# Patient Record
Sex: FEMALE | Race: WHITE | NOT HISPANIC OR LATINO
[De-identification: names, ages, dates, MRNs, and addresses within clinical notes are randomized per-mention and may not be internally consistent; named-entity substitution may affect disease eponyms.]

---

## 2017-03-01 ENCOUNTER — RX RENEWAL (OUTPATIENT)
Age: 67
End: 2017-03-01

## 2017-03-29 ENCOUNTER — NON-APPOINTMENT (OUTPATIENT)
Age: 67
End: 2017-03-29

## 2017-03-29 ENCOUNTER — APPOINTMENT (OUTPATIENT)
Dept: CARDIOLOGY | Facility: CLINIC | Age: 67
End: 2017-03-29

## 2017-03-29 VITALS
OXYGEN SATURATION: 95 % | WEIGHT: 232 LBS | SYSTOLIC BLOOD PRESSURE: 123 MMHG | BODY MASS INDEX: 38.65 KG/M2 | HEART RATE: 77 BPM | DIASTOLIC BLOOD PRESSURE: 82 MMHG | HEIGHT: 65 IN

## 2017-03-29 DIAGNOSIS — R73.03 PREDIABETES.: ICD-10-CM

## 2017-04-10 ENCOUNTER — APPOINTMENT (OUTPATIENT)
Dept: CARDIOLOGY | Facility: CLINIC | Age: 67
End: 2017-04-10

## 2017-04-25 ENCOUNTER — RX RENEWAL (OUTPATIENT)
Age: 67
End: 2017-04-25

## 2017-05-02 ENCOUNTER — APPOINTMENT (OUTPATIENT)
Dept: CARDIOLOGY | Facility: CLINIC | Age: 67
End: 2017-05-02

## 2017-10-18 ENCOUNTER — RX RENEWAL (OUTPATIENT)
Age: 67
End: 2017-10-18

## 2018-05-18 ENCOUNTER — APPOINTMENT (OUTPATIENT)
Dept: VASCULAR SURGERY | Facility: CLINIC | Age: 68
End: 2018-05-18
Payer: MEDICARE

## 2018-05-18 ENCOUNTER — MOBILE ON CALL (OUTPATIENT)
Age: 68
End: 2018-05-18

## 2018-05-18 VITALS — TEMPERATURE: 98 F | SYSTOLIC BLOOD PRESSURE: 144 MMHG | HEART RATE: 74 BPM | DIASTOLIC BLOOD PRESSURE: 80 MMHG

## 2018-05-18 PROCEDURE — 93970 EXTREMITY STUDY: CPT

## 2018-05-18 PROCEDURE — 99203 OFFICE O/P NEW LOW 30 MIN: CPT

## 2018-05-29 ENCOUNTER — RX RENEWAL (OUTPATIENT)
Age: 68
End: 2018-05-29

## 2018-06-04 ENCOUNTER — APPOINTMENT (OUTPATIENT)
Dept: VASCULAR SURGERY | Facility: CLINIC | Age: 68
End: 2018-06-04
Payer: MEDICARE

## 2018-06-04 PROCEDURE — 36475 ENDOVENOUS RF 1ST VEIN: CPT | Mod: LT

## 2018-06-07 ENCOUNTER — APPOINTMENT (OUTPATIENT)
Dept: VASCULAR SURGERY | Facility: CLINIC | Age: 68
End: 2018-06-07
Payer: MEDICARE

## 2018-06-07 PROCEDURE — 93971 EXTREMITY STUDY: CPT

## 2018-06-11 ENCOUNTER — RX RENEWAL (OUTPATIENT)
Age: 68
End: 2018-06-11

## 2018-06-11 DIAGNOSIS — I83.90 ASYMPTOMATIC VARICOSE VEINS OF UNSPECIFIED LOWER EXTREMITY: ICD-10-CM

## 2018-06-15 ENCOUNTER — APPOINTMENT (OUTPATIENT)
Dept: VASCULAR SURGERY | Facility: CLINIC | Age: 68
End: 2018-06-15
Payer: MEDICARE

## 2018-06-15 PROCEDURE — 36475 ENDOVENOUS RF 1ST VEIN: CPT | Mod: RT

## 2018-06-19 ENCOUNTER — APPOINTMENT (OUTPATIENT)
Dept: VASCULAR SURGERY | Facility: CLINIC | Age: 68
End: 2018-06-19
Payer: MEDICARE

## 2018-06-19 PROCEDURE — 93971 EXTREMITY STUDY: CPT

## 2018-06-27 ENCOUNTER — RX RENEWAL (OUTPATIENT)
Age: 68
End: 2018-06-27

## 2018-07-02 ENCOUNTER — APPOINTMENT (OUTPATIENT)
Dept: VASCULAR SURGERY | Facility: CLINIC | Age: 68
End: 2018-07-02
Payer: MEDICARE

## 2018-07-02 PROCEDURE — 37766 PHLEB VEINS - EXTREM 20+: CPT | Mod: LT

## 2018-07-05 ENCOUNTER — APPOINTMENT (OUTPATIENT)
Dept: VASCULAR SURGERY | Facility: CLINIC | Age: 68
End: 2018-07-05

## 2018-08-17 ENCOUNTER — APPOINTMENT (OUTPATIENT)
Dept: VASCULAR SURGERY | Facility: CLINIC | Age: 68
End: 2018-08-17

## 2018-10-05 ENCOUNTER — RX RENEWAL (OUTPATIENT)
Age: 68
End: 2018-10-05

## 2018-11-27 ENCOUNTER — NON-APPOINTMENT (OUTPATIENT)
Age: 68
End: 2018-11-27

## 2018-11-27 ENCOUNTER — APPOINTMENT (OUTPATIENT)
Dept: CARDIOLOGY | Facility: CLINIC | Age: 68
End: 2018-11-27
Payer: MEDICARE

## 2018-11-27 VITALS
DIASTOLIC BLOOD PRESSURE: 85 MMHG | OXYGEN SATURATION: 95 % | WEIGHT: 233 LBS | HEART RATE: 84 BPM | SYSTOLIC BLOOD PRESSURE: 140 MMHG | HEIGHT: 65 IN | BODY MASS INDEX: 38.82 KG/M2

## 2018-11-27 VITALS — DIASTOLIC BLOOD PRESSURE: 72 MMHG | SYSTOLIC BLOOD PRESSURE: 130 MMHG

## 2018-11-27 PROCEDURE — 93000 ELECTROCARDIOGRAM COMPLETE: CPT

## 2018-11-27 PROCEDURE — 99215 OFFICE O/P EST HI 40 MIN: CPT

## 2018-11-27 NOTE — PHYSICAL EXAM
[General Appearance - In No Acute Distress] : no acute distress [Normal Conjunctiva] : the conjunctiva exhibited no abnormalities [No Oral Pallor] : no oral pallor [] : no respiratory distress [Respiration, Rhythm And Depth] : normal respiratory rhythm and effort [Bowel Sounds] : normal bowel sounds [Abdomen Tenderness] : non-tender [Abnormal Walk] : normal gait [Cyanosis, Localized] : no localized cyanosis [Skin Color & Pigmentation] : normal skin color and pigmentation [Oriented To Time, Place, And Person] : oriented to person, place, and time [Not Palpable] : not palpable [No Precordial Heave] : no precordial heave was noted [Normal Rate] : normal [Rhythm Regular] : regular [Normal S1] : normal S1 [Normal S2] : normal S2 [No Gallop] : no gallop heard [I] : a grade 1 [2+] : left 2+ [No Abnormalities] : the abdominal aorta was not enlarged and no bruit was heard [No Pitting Edema] : no pitting edema present [FreeTextEntry1] : generalized diminished breath sounds [Apical Thrill] : no thrill palpable at the apex [Click] : no click [Pericardial Rub] : no pericardial rub [Right Carotid Bruit] : no bruit heard over the right carotid [Left Carotid Bruit] : no bruit heard over the left carotid

## 2018-11-27 NOTE — HISTORY OF PRESENT ILLNESS
[FreeTextEntry1] : Ms. Kathy Alexander presented to the office today for follow-up cardiac evaluation.\par \par The patient is a 67-year-old female with a history of atypical chest discomfort, hypertension, a dyslipidemia, lower extremity venous insufficiency (status post RF ablation bilaterally), spinal arthritis.\par \par The patient has been doing well from a cardiac symptomatic standpoint since her previous visit here on 3/29/17. Specifically, she does not describe having experienced chest discomfort or dyspnea on exertion in association with her activities, noting that she walks regularly and participates in yoga activities. She has not noted orthopnea or paroxysmal nocturnal dyspnea. She had noted improvement in her degree of lower extremity edema since undergoing the venous ablation procedures bilaterally. She has not experienced any episodes of palpitations, presyncope or syncope.\par \par Review of systems is significant for the patient having been evaluated by a vascular specialist regarding lower extremity venous insufficiency on 5/18/18. She subsequently underwent ablation procedures to both lower extremities, as well as a stab phlebectomy to the left lower extremity, over the following 1 to 2 months.\par \par As far as risk factors for coronary artery disease are concerned, the patient has a history of hypertension, a dyslipidemia (hypertriglyceridemia), and pre-diabetes. She discontinued cigarette smoking in January of 1991, having previously smoked an average of one pack per day for a period of 20 years. She describes having an immediate family history of premature coronary artery disease, stating that her brother suffered "a heart attack" at the age of 49.\par \par Past medical/surgical history according to the patient is otherwise significant for a cholecystectomy, a hysterectomy, and parathyroid surgery.\par \par Echocardiography most recently performed on 4/10/17 revealed normal cardiac chamber sizes with normal left ventricular wall thickness and wall motion. Left ventricular systolic function was normal, with an estimated ejection fraction of 65%. There was evidence for mildly impaired diastolic relaxation of the left ventricle. No significant valvular abnormalities were identified.\par \par Exercise stress testing most recently performed on 5/2/17 revealed the patient to exhibit a satisfactory exercise tolerance, or that the inducement of cardiac symptoms, electrocardiographic evidence of myocardial ischemia, or significant arrhythmias.\par \par Nuclear exercise testing previously performed at a radiology facility on 2/13/09 was interpreted as revealing the patient to exhibit satisfactory exercise tolerance, without the inducement of cardiac symptoms, electrocardiographic evidence of myocardial ischemia, or significant arrhythmias. The cardiac imaging portion of the study revealed normal left ventricular myocardial perfusion and systolic function, with a calculated ejection fraction of 90%\par \par Carotid artery Doppler testing performed most recently on 1/6/15 at a radiology facility was not reported as having demonstrated evidence for atherosclerosis formation or significant stenoses.\par \par Laboratory studies most recently performed on 3/1/17 revealed cholesterol 148, triglycerides 146, HDL 37, and calculated LDL 82. The liver chemistries were normal. The glucose level was 105 and the hemoglobin A1c level was 6.3%.  The BUN and creatinine were 16 and 0.76, respectively. The potassium level was 4.3 and her sodium level was 141. The thyroid stimulating hormone level was 1.12. The hemoglobin and hematocrit were 13.9 and 41.1, respectively.

## 2018-11-27 NOTE — DISCUSSION/SUMMARY
[FreeTextEntry1] : The patient has been doing well from a cardiac symptomatic standpoint since her previous visit here on 3/29/17. Specifically, she does not describe having experienced any symptoms of chest discomfort which would be considered typical for an anginal syndrome. She has not experienced any signs or symptoms to suggest the presence of congestive heart failure or a hemodynamically-compromising arrhythmia. Her cardiac examination today is remarkable for a faint systolic ejection murmur, unchanged from her previous visit with me. Her blood pressure reading today is normal. Her electrocardiogram today reveals sinus rhythm with a mildly leftward axis, a right intraventricular conduction delay, non-specific poor R wave progression, and non-diagnostic repolarization abnormalities. Comparison with her previous office tracing performed on 3/29/17 reveals a mild increase in the QRS duration on the present tracing.\par \par I have reviewed the findings of the blood test of 3/1/17 in detail with the patient today, and I have recommended to her that she continue on the present dosage of Trilipix for the time being. I asked to have a copy of her most recent fasting lipid profile and chemistry screen results forwarded to my office for my review and records.\par \par As her blood pressure reading today s normal, I have instructed her to continue on the present dosages of Toprol-XL and Dyazide for the time being.\par \par The importance of dietary modification, weight loss, and regular exercise as tolerated was again emphasized to the patient today.\par \par I have reviewed the findings of the exercise stress test in 5/2/17 and the echocardiogram of 4/10/17 in detail with the patient today.\par \par I have asked the patient to call me if she should have any questions or problems pertaining to these matters, and especially if she should experience any concerning symptoms. I have otherwise asked her to return to the office for follow-up cardiac evaluation in one year, provided she remains clinically stable in the interim.

## 2019-01-28 ENCOUNTER — MEDICATION RENEWAL (OUTPATIENT)
Age: 69
End: 2019-01-28

## 2019-05-21 ENCOUNTER — APPOINTMENT (OUTPATIENT)
Dept: CARDIOLOGY | Facility: CLINIC | Age: 69
End: 2019-05-21
Payer: MEDICARE

## 2019-05-21 ENCOUNTER — NON-APPOINTMENT (OUTPATIENT)
Age: 69
End: 2019-05-21

## 2019-05-21 VITALS
OXYGEN SATURATION: 91 % | SYSTOLIC BLOOD PRESSURE: 130 MMHG | BODY MASS INDEX: 38.15 KG/M2 | WEIGHT: 229 LBS | DIASTOLIC BLOOD PRESSURE: 84 MMHG | HEIGHT: 65 IN | HEART RATE: 87 BPM

## 2019-05-21 PROCEDURE — 99214 OFFICE O/P EST MOD 30 MIN: CPT

## 2019-05-21 PROCEDURE — 93000 ELECTROCARDIOGRAM COMPLETE: CPT

## 2019-05-21 NOTE — DISCUSSION/SUMMARY
[FreeTextEntry1] : She has been doing well from a cardiac symptomatic standpoint since her previous visit, though she now reports episodes of dizziness.\par Her EKG demonstrates a SR with RBBB and LAD. Though I think her dizziness is not primarily cardiac related, she does have baseline conduction disease which seems to have progressed slightly. I have offered her a 24 hour holter monitor, though her symptoms may not be frequent enough. I have asked her to monitor her heart rate on her fit bit to see if there is any significant decreased (or increase) in her heart rate with the symptoms. We may then need to set her up for a longer term monitor.\par She will have a 2d echocardiogram in our office to evaluate her heart murmur. She is to continue her current BP regimen.\par She is to stay hydrated, exercise, and monitor her BP at home. The importance of dietary modification, weight loss, and regular exercise as tolerated was again emphasized to the patient today.I will call her with the results of the above test and arrange follow up.\par

## 2019-05-21 NOTE — HISTORY OF PRESENT ILLNESS
[FreeTextEntry1] : Ms. Kathy Alexander presented to the office today for follow-up cardiac evaluation.\par \par The patient is a 68-year-old female with a history of atypical chest discomfort, hypertension, a dyslipidemia, lower extremity venous insufficiency (status post RF ablation bilaterally), spinal arthritis.\par \par The patient has been doing well from a cardiac symptomatic standpoint until recently. \par She has reports episodes of dizziness, that occur a few times per week. These episodes start with a feeling in her feet and legs, that moves up to her head. She then is dizzy for 30 seconds. She denies chest pain, difficulty breathing, palpitations, lightheadedness or getting to the point of syncope.\par \par There is no association with her activities, noting that she walks regularly and participates in yoga activities. Though she wears a fitbit, she has not checked her heart rate during the episodes.\par \par Review of systems is significant for the patient having been evaluated by a vascular specialist regarding lower extremity venous insufficiency on 5/18/18. She subsequently underwent ablation procedures to both lower extremities, as well as a stab phlebectomy to the left lower extremity, over the following 1 to 2 months.\par \par As far as risk factors for coronary artery disease are concerned, the patient has a history of hypertension, a dyslipidemia (hypertriglyceridemia), and pre-diabetes. She discontinued cigarette smoking in January of 1991, having previously smoked an average of one pack per day for a period of 20 years.\par Past medical/surgical history according to the patient is otherwise significant for a cholecystectomy, a hysterectomy, and parathyroid surgery.

## 2019-05-21 NOTE — PHYSICAL EXAM
[General Appearance - In No Acute Distress] : no acute distress [Normal Conjunctiva] : the conjunctiva exhibited no abnormalities [No Oral Pallor] : no oral pallor [] : no respiratory distress [Respiration, Rhythm And Depth] : normal respiratory rhythm and effort [Bowel Sounds] : normal bowel sounds [Abdomen Tenderness] : non-tender [Abnormal Walk] : normal gait [Cyanosis, Localized] : no localized cyanosis [Skin Color & Pigmentation] : normal skin color and pigmentation [Oriented To Time, Place, And Person] : oriented to person, place, and time [Not Palpable] : not palpable [No Precordial Heave] : no precordial heave was noted [Normal Rate] : normal [Rhythm Regular] : regular [Normal S1] : normal S1 [Normal S2] : normal S2 [No Gallop] : no gallop heard [I] : a grade 1 [2+] : left 2+ [No Abnormalities] : the abdominal aorta was not enlarged and no bruit was heard [No Pitting Edema] : no pitting edema present [General Appearance - Well Developed] : well developed [Normal Appearance] : normal appearance [Well Groomed] : well groomed [General Appearance - Well Nourished] : well nourished [No Deformities] : no deformities [FreeTextEntry1] : generalized diminished breath sounds [Apical Thrill] : no thrill palpable at the apex [Click] : no click [Pericardial Rub] : no pericardial rub [II] : a grade 2 [Right Carotid Bruit] : no bruit heard over the right carotid [Left Carotid Bruit] : no bruit heard over the left carotid

## 2019-05-30 ENCOUNTER — APPOINTMENT (OUTPATIENT)
Dept: CARDIOLOGY | Facility: CLINIC | Age: 69
End: 2019-05-30
Payer: MEDICARE

## 2019-05-30 PROCEDURE — 93306 TTE W/DOPPLER COMPLETE: CPT

## 2019-07-18 ENCOUNTER — OUTPATIENT (OUTPATIENT)
Dept: OUTPATIENT SERVICES | Facility: HOSPITAL | Age: 69
LOS: 1 days | End: 2019-07-18
Payer: MEDICARE

## 2019-07-18 VITALS
HEART RATE: 77 BPM | OXYGEN SATURATION: 97 % | SYSTOLIC BLOOD PRESSURE: 134 MMHG | DIASTOLIC BLOOD PRESSURE: 82 MMHG | WEIGHT: 220.02 LBS | TEMPERATURE: 98 F | HEIGHT: 65 IN | RESPIRATION RATE: 16 BRPM

## 2019-07-18 DIAGNOSIS — D21.0 BENIGN NEOPLASM OF CONNECTIVE AND OTHER SOFT TISSUE OF HEAD, FACE AND NECK: ICD-10-CM

## 2019-07-18 DIAGNOSIS — Z01.818 ENCOUNTER FOR OTHER PREPROCEDURAL EXAMINATION: ICD-10-CM

## 2019-07-18 LAB
ALBUMIN SERPL ELPH-MCNC: 4.1 G/DL — SIGNIFICANT CHANGE UP (ref 3.3–5)
ALP SERPL-CCNC: 50 U/L — SIGNIFICANT CHANGE UP (ref 40–120)
ALT FLD-CCNC: 38 U/L — SIGNIFICANT CHANGE UP (ref 12–78)
ANION GAP SERPL CALC-SCNC: 5 MMOL/L — SIGNIFICANT CHANGE UP (ref 5–17)
AST SERPL-CCNC: 20 U/L — SIGNIFICANT CHANGE UP (ref 15–37)
BILIRUB SERPL-MCNC: 0.5 MG/DL — SIGNIFICANT CHANGE UP (ref 0.2–1.2)
BUN SERPL-MCNC: 18 MG/DL — SIGNIFICANT CHANGE UP (ref 7–23)
CALCIUM SERPL-MCNC: 9.7 MG/DL — SIGNIFICANT CHANGE UP (ref 8.5–10.1)
CHLORIDE SERPL-SCNC: 105 MMOL/L — SIGNIFICANT CHANGE UP (ref 96–108)
CO2 SERPL-SCNC: 30 MMOL/L — SIGNIFICANT CHANGE UP (ref 22–31)
CREAT SERPL-MCNC: 0.74 MG/DL — SIGNIFICANT CHANGE UP (ref 0.5–1.3)
GLUCOSE SERPL-MCNC: 108 MG/DL — HIGH (ref 70–99)
HCT VFR BLD CALC: 42.6 % — SIGNIFICANT CHANGE UP (ref 34.5–45)
HGB BLD-MCNC: 14.1 G/DL — SIGNIFICANT CHANGE UP (ref 11.5–15.5)
MCHC RBC-ENTMCNC: 28 PG — SIGNIFICANT CHANGE UP (ref 27–34)
MCHC RBC-ENTMCNC: 33.1 GM/DL — SIGNIFICANT CHANGE UP (ref 32–36)
MCV RBC AUTO: 84.7 FL — SIGNIFICANT CHANGE UP (ref 80–100)
NRBC # BLD: 0 /100 WBCS — SIGNIFICANT CHANGE UP (ref 0–0)
PLATELET # BLD AUTO: 307 K/UL — SIGNIFICANT CHANGE UP (ref 150–400)
POTASSIUM SERPL-MCNC: 3.8 MMOL/L — SIGNIFICANT CHANGE UP (ref 3.5–5.3)
POTASSIUM SERPL-SCNC: 3.8 MMOL/L — SIGNIFICANT CHANGE UP (ref 3.5–5.3)
PROT SERPL-MCNC: 7.6 G/DL — SIGNIFICANT CHANGE UP (ref 6–8.3)
RBC # BLD: 5.03 M/UL — SIGNIFICANT CHANGE UP (ref 3.8–5.2)
RBC # FLD: 12.8 % — SIGNIFICANT CHANGE UP (ref 10.3–14.5)
SODIUM SERPL-SCNC: 140 MMOL/L — SIGNIFICANT CHANGE UP (ref 135–145)
WBC # BLD: 7.18 K/UL — SIGNIFICANT CHANGE UP (ref 3.8–10.5)
WBC # FLD AUTO: 7.18 K/UL — SIGNIFICANT CHANGE UP (ref 3.8–10.5)

## 2019-07-18 PROCEDURE — 71046 X-RAY EXAM CHEST 2 VIEWS: CPT

## 2019-07-18 PROCEDURE — 85027 COMPLETE CBC AUTOMATED: CPT

## 2019-07-18 PROCEDURE — 80053 COMPREHEN METABOLIC PANEL: CPT

## 2019-07-18 PROCEDURE — 71046 X-RAY EXAM CHEST 2 VIEWS: CPT | Mod: 26

## 2019-07-18 PROCEDURE — 93005 ELECTROCARDIOGRAM TRACING: CPT

## 2019-07-18 PROCEDURE — 36415 COLL VENOUS BLD VENIPUNCTURE: CPT

## 2019-07-18 PROCEDURE — 93010 ELECTROCARDIOGRAM REPORT: CPT

## 2019-07-18 PROCEDURE — G0463: CPT

## 2019-07-18 NOTE — H&P PST ADULT - LIVES WITH, PROFILE
Reason For Visit  GIOVANI FOSTER is here today for a nurse visit for medication administration B12 pt tolerated well with no adverse reactions.      Current Meds   1. Lisinopril 2.5 MG Oral Tablet; take one tablet by mouth one time daily;   Therapy: [id number] to (Last Rx:[id number])  Requested for: [id number] Ordered    Allergies  No Known Drug Allergies    Plan   1. med B12 1000MCG    Signatures   Electronically signed by : ERICA James; Apr 26 2018 11:06AM CST    Electronically signed by : NATALIE WANG M.D.; Apr 26 2018 11:10AM CST (Review)    
spouse

## 2019-07-18 NOTE — H&P PST ADULT - NSICDXPASTSURGICALHX_GEN_ALL_CORE_FT
PAST SURGICAL HISTORY:  S/P  Section     S/P cholecystectomy     S/P hysterectomy     S/P parathyroidectomy     S/P tonsillectomy 1967    S/P tubal ligation 1992

## 2019-07-18 NOTE — H&P PST ADULT - HISTORY OF PRESENT ILLNESS
67 yo F for excision right scalp mass 69 yo F for excision right scalp mass  Pt has had mass x 6 mos increasing in size

## 2019-07-18 NOTE — H&P PST ADULT - NSANTHOSAYNRD_GEN_A_CORE
No. REENA screening performed.  STOP BANG Legend: 0-2 = LOW Risk; 3-4 = INTERMEDIATE Risk; 5-8 = HIGH Risk

## 2019-07-30 ENCOUNTER — TRANSCRIPTION ENCOUNTER (OUTPATIENT)
Age: 69
End: 2019-07-30

## 2019-07-31 ENCOUNTER — OUTPATIENT (OUTPATIENT)
Dept: OUTPATIENT SERVICES | Facility: HOSPITAL | Age: 69
LOS: 1 days | End: 2019-07-31
Payer: MEDICARE

## 2019-07-31 ENCOUNTER — RESULT REVIEW (OUTPATIENT)
Age: 69
End: 2019-07-31

## 2019-07-31 VITALS
WEIGHT: 216.05 LBS | TEMPERATURE: 98 F | DIASTOLIC BLOOD PRESSURE: 72 MMHG | SYSTOLIC BLOOD PRESSURE: 145 MMHG | HEART RATE: 68 BPM | RESPIRATION RATE: 16 BRPM | HEIGHT: 65 IN | OXYGEN SATURATION: 97 %

## 2019-07-31 VITALS
HEART RATE: 70 BPM | SYSTOLIC BLOOD PRESSURE: 122 MMHG | OXYGEN SATURATION: 97 % | DIASTOLIC BLOOD PRESSURE: 76 MMHG | RESPIRATION RATE: 15 BRPM | TEMPERATURE: 99 F

## 2019-07-31 DIAGNOSIS — Z98.890 OTHER SPECIFIED POSTPROCEDURAL STATES: Chronic | ICD-10-CM

## 2019-07-31 DIAGNOSIS — D21.0 BENIGN NEOPLASM OF CONNECTIVE AND OTHER SOFT TISSUE OF HEAD, FACE AND NECK: ICD-10-CM

## 2019-07-31 DIAGNOSIS — Z01.818 ENCOUNTER FOR OTHER PREPROCEDURAL EXAMINATION: ICD-10-CM

## 2019-07-31 PROCEDURE — 12032 INTMD RPR S/A/T/EXT 2.6-7.5: CPT

## 2019-07-31 PROCEDURE — 11426 EXC H-F-NK-SP B9+MARG >4 CM: CPT

## 2019-07-31 PROCEDURE — 88305 TISSUE EXAM BY PATHOLOGIST: CPT | Mod: 26

## 2019-07-31 PROCEDURE — 88305 TISSUE EXAM BY PATHOLOGIST: CPT

## 2019-07-31 RX ORDER — OXYCODONE HYDROCHLORIDE 5 MG/1
5 TABLET ORAL ONCE
Refills: 0 | Status: DISCONTINUED | OUTPATIENT
Start: 2019-07-31 | End: 2019-07-31

## 2019-07-31 RX ORDER — METOCLOPRAMIDE HCL 10 MG
5 TABLET ORAL ONCE
Refills: 0 | Status: DISCONTINUED | OUTPATIENT
Start: 2019-07-31 | End: 2019-07-31

## 2019-07-31 RX ORDER — HYDROMORPHONE HYDROCHLORIDE 2 MG/ML
0.5 INJECTION INTRAMUSCULAR; INTRAVENOUS; SUBCUTANEOUS
Refills: 0 | Status: DISCONTINUED | OUTPATIENT
Start: 2019-07-31 | End: 2019-07-31

## 2019-07-31 RX ORDER — SODIUM CHLORIDE 9 MG/ML
1000 INJECTION, SOLUTION INTRAVENOUS
Refills: 0 | Status: DISCONTINUED | OUTPATIENT
Start: 2019-07-31 | End: 2019-07-31

## 2019-07-31 RX ORDER — CEFAZOLIN SODIUM 1 G
1000 VIAL (EA) INJECTION ONCE
Refills: 0 | Status: COMPLETED | OUTPATIENT
Start: 2019-07-31 | End: 2019-07-31

## 2019-07-31 RX ADMIN — SODIUM CHLORIDE 100 MILLILITER(S): 9 INJECTION, SOLUTION INTRAVENOUS at 11:23

## 2019-07-31 RX ADMIN — SODIUM CHLORIDE 75 MILLILITER(S): 9 INJECTION, SOLUTION INTRAVENOUS at 09:40

## 2019-07-31 NOTE — ASU DISCHARGE PLAN (ADULT/PEDIATRIC) - CARE PROVIDER_API CALL
Yassine Garcia ()  Surgery  [address] , [place] [address]  Phone: [phone]  Fax: [phone]  Follow Up Time:

## 2019-07-31 NOTE — ASU DISCHARGE PLAN (ADULT/PEDIATRIC) - CALL YOUR DOCTOR IF YOU HAVE ANY OF THE FOLLOWING:
Bleeding that does not stop/Pain not relieved by Medications/Wound/Surgical Site with redness, or foul smelling discharge or pus/Fever greater than (need to indicate Fahrenheit or Celsius)/Swelling that gets worse

## 2019-07-31 NOTE — ASU PATIENT PROFILE, ADULT - PSH
S/P bunionectomy  right bunionectomy with screws  S/P  Section    S/P cholecystectomy    S/P hysterectomy    S/P parathyroidectomy    S/P tonsillectomy  1967  S/P tubal ligation  1992

## 2019-08-01 LAB — SURGICAL PATHOLOGY STUDY: SIGNIFICANT CHANGE UP

## 2019-09-04 ENCOUNTER — APPOINTMENT (OUTPATIENT)
Dept: VASCULAR SURGERY | Facility: CLINIC | Age: 69
End: 2019-09-04
Payer: MEDICARE

## 2019-09-04 VITALS
HEIGHT: 65 IN | WEIGHT: 213 LBS | TEMPERATURE: 99.1 F | HEART RATE: 75 BPM | DIASTOLIC BLOOD PRESSURE: 78 MMHG | SYSTOLIC BLOOD PRESSURE: 136 MMHG | BODY MASS INDEX: 35.49 KG/M2

## 2019-09-04 PROCEDURE — 99212 OFFICE O/P EST SF 10 MIN: CPT

## 2019-09-04 PROCEDURE — 93970 EXTREMITY STUDY: CPT

## 2019-09-04 NOTE — PHYSICAL EXAM
[Normal Breath Sounds] : Normal breath sounds [Normal Heart Sounds] : normal heart sounds [2+] : right 2+ [Varicose Veins Of Lower Extremities] : bilaterally [Ankle Swelling On The Right] : mild [JVD] : no jugular venous distention  [Ankle Swelling (On Exam)] : not present [Alert] : alert [] : not present [Oriented to Person] : oriented to person [Oriented to Place] : oriented to place [Oriented to Time] : oriented to time [Calm] : calm [de-identified] : well appearing  [de-identified] : Small palpable varicosities on medialand lateral  right leg

## 2019-09-04 NOTE — HISTORY OF PRESENT ILLNESS
[FreeTextEntry1] : 68F s/p left GSV RFA (6/2018) and left stab phlebectomy, right ASV RFA (6/15/2018) presents for evaluation of new varicosities. Over the past few weeks she noticed new varicosities on both her right and left leg, which have been itchy and tender. She denies any fatigue, heaviness or cramps.  She has been wearing her compression stockings.

## 2019-09-04 NOTE — ASSESSMENT
[FreeTextEntry1] : 68F s/p R ASV RFA and L GSV RFA with new varicosties and some right ASV reflux. \par - Will continue compression stockings. \par may need stab avulsions in the future if symptoms get worse

## 2019-09-04 NOTE — DATA REVIEWED
[FreeTextEntry1] : Duplex shows some right ASV reflux, less than 5cm from junction. Multiple varicosities in right and left LE. Right and left GSV closed

## 2019-10-15 ENCOUNTER — APPOINTMENT (OUTPATIENT)
Dept: CARDIOLOGY | Facility: CLINIC | Age: 69
End: 2019-10-15
Payer: MEDICARE

## 2019-10-15 ENCOUNTER — NON-APPOINTMENT (OUTPATIENT)
Age: 69
End: 2019-10-15

## 2019-10-15 VITALS
BODY MASS INDEX: 35.82 KG/M2 | DIASTOLIC BLOOD PRESSURE: 84 MMHG | WEIGHT: 215 LBS | SYSTOLIC BLOOD PRESSURE: 131 MMHG | OXYGEN SATURATION: 97 % | HEIGHT: 65 IN | HEART RATE: 80 BPM

## 2019-10-15 PROCEDURE — 99215 OFFICE O/P EST HI 40 MIN: CPT

## 2019-10-15 PROCEDURE — 93000 ELECTROCARDIOGRAM COMPLETE: CPT

## 2019-10-15 NOTE — HISTORY OF PRESENT ILLNESS
[FreeTextEntry1] : Ms. Kathy Alexander presented to the office today for follow-up cardiac evaluation.\par \par The patient is a 68-year-old female with a history of a right bundle branch block pattern on her electrocardiogram, atypical chest discomfort, hypertension, a dyslipidemia, pre-diabetes, lower extremity venous insufficiency (status post RF ablation bilaterally), spinal arthritis.\par \par The patient has been doing well from a cardiac symptomatic standpoint since her previous visit here. Specifically, she does not describe having experienced chest discomfort or dyspnea on exertion in association with her activities, noting that she walks regularly and participates in yoga activities. She has not noted orthopnea or paroxysmal nocturnal dyspnea. She has noted improvement in her degree of lower extremity edema since undergoing the venous ablation procedures bilaterally. She has not experienced any episodes of palpitations, presyncope or syncope.\par \par The patient had previously been evaluated in our office on 5/21/19 by Dr. Clark Re: episodes of dizziness, which the patient states her ultimately attributed to "allergies". She has not experienced recurrence of any episodes of dizziness since August of 2019.\par \par Review of systems is significant for the patient having been evaluated by a vascular specialist regarding lower extremity venous insufficiency on 5/18/18. She subsequently underwent ablation procedures to both lower extremities, as well as a stab phlebectomy to the left lower extremity, over the following 1 to 2 months.\par \par As far as risk factors for coronary artery disease are concerned, the patient has a history of hypertension, a dyslipidemia (hypertriglyceridemia), and pre-diabetes. She discontinued cigarette smoking in January of 1991, having previously smoked an average of one pack per day for a period of 20 years. She describes having an immediate family history of premature coronary artery disease, stating that her brother suffered "a heart attack" at the age of 49.\par \par Past medical/surgical history according to the patient is otherwise significant for a cholecystectomy, a hysterectomy, and parathyroid surgery.\par \par Echocardiography most recently performed on 5/30/19 revealed normal cardiac chamber sizes with normal left ventricular wall thickness and wall motion. Left ventricular systolic function was normal, with an estimated ejection fraction of 70%. There was evidence for mildly impaired diastolic relaxation of the left ventricle. No significant valvular abnormalities were identified.\par \par Exercise stress testing most recently performed on 5/2/17 revealed the patient to exhibit a satisfactory exercise tolerance, or that the inducement of cardiac symptoms, electrocardiographic evidence of myocardial ischemia, or significant arrhythmias.\par \par Nuclear exercise testing previously performed at a radiology facility on 2/13/09 was interpreted as revealing the patient to exhibit satisfactory exercise tolerance, without the inducement of cardiac symptoms, electrocardiographic evidence of myocardial ischemia, or significant arrhythmias. The cardiac imaging portion of the study revealed normal left ventricular myocardial perfusion and systolic function, with a calculated ejection fraction of 90%\par \par Carotid artery Doppler testing performed most recently on 1/6/15 at a radiology facility was not reported as having demonstrated evidence for atherosclerosis formation or significant stenoses.

## 2019-10-15 NOTE — DISCUSSION/SUMMARY
[FreeTextEntry1] : The patient has been doing well from a cardiac symptomatic standpoint since her previous visit here. Specifically, she does not describe having experienced any symptoms of chest discomfort which would be considered typical for an anginal syndrome. She has not experienced any signs or symptoms to suggest the presence of congestive heart failure or a hemodynamically-compromising arrhythmia. Her cardiac examination today is remarkable for a faint systolic ejection murmur, unchanged from her previous visit with me. Her blood pressure reading today is normal. Her electrocardiogram today reveals sinus rhythm with a left anterior hemiblock, a right bundle branch block pattern,, and non-diagnostic repolarization abnormalities, essentially unchanged from her previous office tracing.\par \par I have asked the patient to have a copy of her next fasting lipid profile and chemistry screen results forwarded to my office for my review and records. I have recommended to her that she continue on the present dosage of Trilipix for the time being.\par \par As her blood pressure reading today is normal, I have instructed the patient to continue the present dosages of Toprol-XL and Dyazide for the time being.\par \par The importance of dietary modification, weight loss, and regular exercise as tolerated was again emphasized to the patient today.\par \par I have reviewed the findings of the echocardiogram of 5/30/19 in detail with the patient today.\par \par In view of her multiple cardiovascular risk factors, I am referring the patient for follow-up exercise stress testing at this point to reassess for the presence of underlying ischemic heart disease. The patient is going to make arrangements to have this study performed through our office, and I will telephone her to discuss the findings, once the study has been completed.\par \par I have asked the patient to call me if she should have any questions or problems pertaining to these matters, and especially if she should experience any concerning symptoms. I have otherwise asked her to return to the office for follow-up cardiac evaluation in 6 months, provided she remains clinically stable in the interim.

## 2019-10-15 NOTE — PHYSICAL EXAM
[General Appearance - In No Acute Distress] : no acute distress [Normal Conjunctiva] : the conjunctiva exhibited no abnormalities [No Oral Pallor] : no oral pallor [] : no respiratory distress [Respiration, Rhythm And Depth] : normal respiratory rhythm and effort [Bowel Sounds] : normal bowel sounds [Abdomen Tenderness] : non-tender [Abnormal Walk] : normal gait [Cyanosis, Localized] : no localized cyanosis [Skin Color & Pigmentation] : normal skin color and pigmentation [Oriented To Time, Place, And Person] : oriented to person, place, and time [Not Palpable] : not palpable [No Precordial Heave] : no precordial heave was noted [Normal Rate] : normal [Normal S1] : normal S1 [Rhythm Regular] : regular [Normal S2] : normal S2 [No Gallop] : no gallop heard [I] : a grade 1 [2+] : left 2+ [No Pitting Edema] : no pitting edema present [No Abnormalities] : the abdominal aorta was not enlarged and no bruit was heard [FreeTextEntry1] : generalized diminished breath sounds [Apical Thrill] : no thrill palpable at the apex [Click] : no click [Left Carotid Bruit] : no bruit heard over the left carotid [Pericardial Rub] : no pericardial rub [Right Carotid Bruit] : no bruit heard over the right carotid

## 2019-10-16 ENCOUNTER — MEDICATION RENEWAL (OUTPATIENT)
Age: 69
End: 2019-10-16

## 2019-11-19 ENCOUNTER — APPOINTMENT (OUTPATIENT)
Dept: CARDIOLOGY | Facility: CLINIC | Age: 69
End: 2019-11-19
Payer: MEDICARE

## 2019-11-19 PROCEDURE — 93015 CV STRESS TEST SUPVJ I&R: CPT

## 2020-12-29 ENCOUNTER — APPOINTMENT (OUTPATIENT)
Dept: CARDIOLOGY | Facility: CLINIC | Age: 70
End: 2020-12-29
Payer: MEDICARE

## 2020-12-29 ENCOUNTER — NON-APPOINTMENT (OUTPATIENT)
Age: 70
End: 2020-12-29

## 2020-12-29 VITALS
OXYGEN SATURATION: 96 % | DIASTOLIC BLOOD PRESSURE: 90 MMHG | HEART RATE: 81 BPM | SYSTOLIC BLOOD PRESSURE: 150 MMHG | HEIGHT: 65 IN | BODY MASS INDEX: 36.65 KG/M2 | WEIGHT: 220 LBS

## 2020-12-29 PROCEDURE — 93000 ELECTROCARDIOGRAM COMPLETE: CPT

## 2020-12-29 PROCEDURE — 99215 OFFICE O/P EST HI 40 MIN: CPT

## 2020-12-29 RX ORDER — SERTRALINE HYDROCHLORIDE 50 MG/1
50 TABLET, FILM COATED ORAL
Qty: 90 | Refills: 0 | Status: DISCONTINUED | COMMUNITY
Start: 2020-10-09 | End: 2020-12-29

## 2020-12-29 RX ORDER — MELOXICAM 15 MG/1
15 TABLET ORAL
Qty: 30 | Refills: 0 | Status: DISCONTINUED | COMMUNITY
Start: 2020-12-21 | End: 2020-12-29

## 2021-01-29 ENCOUNTER — NON-APPOINTMENT (OUTPATIENT)
Age: 71
End: 2021-01-29

## 2021-02-01 ENCOUNTER — APPOINTMENT (OUTPATIENT)
Dept: CARDIOLOGY | Facility: CLINIC | Age: 71
End: 2021-02-01

## 2022-06-28 ENCOUNTER — NON-APPOINTMENT (OUTPATIENT)
Age: 72
End: 2022-06-28

## 2022-07-05 ENCOUNTER — RESULT CHARGE (OUTPATIENT)
Age: 72
End: 2022-07-05

## 2022-07-06 ENCOUNTER — APPOINTMENT (OUTPATIENT)
Dept: CARDIOLOGY | Facility: CLINIC | Age: 72
End: 2022-07-06

## 2022-07-06 ENCOUNTER — NON-APPOINTMENT (OUTPATIENT)
Age: 72
End: 2022-07-06

## 2022-07-06 VITALS — DIASTOLIC BLOOD PRESSURE: 70 MMHG | SYSTOLIC BLOOD PRESSURE: 115 MMHG

## 2022-07-06 VITALS
WEIGHT: 216 LBS | DIASTOLIC BLOOD PRESSURE: 78 MMHG | SYSTOLIC BLOOD PRESSURE: 125 MMHG | BODY MASS INDEX: 35.99 KG/M2 | HEART RATE: 78 BPM | OXYGEN SATURATION: 97 % | HEIGHT: 65 IN

## 2022-07-06 DIAGNOSIS — R94.31 ABNORMAL ELECTROCARDIOGRAM [ECG] [EKG]: ICD-10-CM

## 2022-07-06 DIAGNOSIS — R07.89 OTHER CHEST PAIN: ICD-10-CM

## 2022-07-06 PROCEDURE — 93000 ELECTROCARDIOGRAM COMPLETE: CPT

## 2022-07-06 PROCEDURE — 99214 OFFICE O/P EST MOD 30 MIN: CPT

## 2022-07-06 RX ORDER — FENOFIBRATE 134 MG/1
134 CAPSULE ORAL
Qty: 90 | Refills: 0 | Status: ACTIVE | COMMUNITY
Start: 2022-03-18

## 2022-07-06 RX ORDER — LOSARTAN POTASSIUM 25 MG/1
25 TABLET, FILM COATED ORAL
Qty: 90 | Refills: 0 | Status: ACTIVE | COMMUNITY
Start: 2022-04-12

## 2022-07-06 RX ORDER — TRIAMTERENE AND HYDROCHLOROTHIAZIDE 37.5; 25 MG/1; MG/1
37.5-25 CAPSULE ORAL
Qty: 60 | Refills: 0 | Status: COMPLETED | COMMUNITY
Start: 2022-07-01

## 2022-07-06 RX ORDER — AZITHROMYCIN 500 MG/1
500 TABLET, FILM COATED ORAL
Qty: 10 | Refills: 0 | Status: COMPLETED | COMMUNITY
Start: 2022-06-15

## 2022-07-06 RX ORDER — OXYCODONE 5 MG/1
5 TABLET ORAL
Qty: 42 | Refills: 0 | Status: COMPLETED | COMMUNITY
Start: 2022-04-22

## 2022-07-06 RX ORDER — NITROFURANTOIN (MONOHYDRATE/MACROCRYSTALS) 25; 75 MG/1; MG/1
100 CAPSULE ORAL
Qty: 14 | Refills: 0 | Status: COMPLETED | COMMUNITY
Start: 2022-04-07

## 2022-07-06 NOTE — PHYSICAL EXAM
[General Appearance - In No Acute Distress] : no acute distress [No Oral Pallor] : no oral pallor [] : no respiratory distress [Respiration, Rhythm And Depth] : normal respiratory rhythm and effort [Bowel Sounds] : normal bowel sounds [Abdomen Tenderness] : non-tender [Abnormal Walk] : normal gait [Cyanosis, Localized] : no localized cyanosis [Skin Color & Pigmentation] : normal skin color and pigmentation [Oriented To Time, Place, And Person] : oriented to person, place, and time [Not Palpable] : not palpable [No Precordial Heave] : no precordial heave was noted [Normal Rate] : normal [I] : a grade 1 [2+] : left 2+ [Well Developed] : well developed [Well Nourished] : well nourished [No Acute Distress] : no acute distress [Normal Conjunctiva] : normal conjunctiva [Normal Venous Pressure] : normal venous pressure [No Carotid Bruit] : no carotid bruit [Normal S1, S2] : normal S1, S2 [No Rub] : no rub [No Gallop] : no gallop [Rhythm Regular] : regular [Normal S1] : normal S1 [Normal S2] : normal S2 [No Murmur] : no murmurs heard [No Pitting Edema] : no pitting edema present [No Abnormalities] : the abdominal aorta was not enlarged and no bruit was heard [Clear Lung Fields] : clear lung fields [Good Air Entry] : good air entry [No Respiratory Distress] : no respiratory distress  [Soft] : abdomen soft [Non Tender] : non-tender [No Masses/organomegaly] : no masses/organomegaly [Normal Bowel Sounds] : normal bowel sounds [Normal Gait] : normal gait [No Edema] : no edema [No Cyanosis] : no cyanosis [No Clubbing] : no clubbing [No Varicosities] : no varicosities [No Skin Lesions] : no skin lesions [No Rash] : no rash [Moves all extremities] : moves all extremities [No Focal Deficits] : no focal deficits [Normal Speech] : normal speech [Alert and Oriented] : alert and oriented [Normal memory] : normal memory [Bruit] : no bruit heard [FreeTextEntry1] : generalized diminished breath sounds [Apical Thrill] : no thrill palpable at the apex [Click] : no click [Pericardial Rub] : no pericardial rub [Left Carotid Bruit] : no bruit heard over the left carotid [Right Carotid Bruit] : no bruit heard over the right carotid

## 2022-07-06 NOTE — DISCUSSION/SUMMARY
[FreeTextEntry1] : Ms Alexander presents in no acute distress. \par She does not describe having experienced any symptoms of chest discomfort which would be considered typical anginal symptoms.  She has not experienced any signs or symptoms to suggest the presence of congestive heart failure or a hemodynamically-compromising arrhythmia.  Her blood pressure reading today is normal. Her electrocardiogram today reveals sinus rhythm with left anterior hemiblock, a right bundle branch block pattern, and non-diagnostic repolarization abnormalities, essentially unchanged from her previous office tracing.\par As her blood pressure reading today is normal, I have instructed the patient to continue  Toprol-XL 50mg,  and Dyazide 75/50mg, and Losartan 25mg as added for the time being.\par \par In light of her recent cause of hospitalization, she will have a 24 hour holter to monitor HR variability and for any ivis arrhythmia and bifascicular block.  She will get a 2d echo to assess for any new structural heart disease, changes in valvular and ventricular function.\par \par Most recent stress test 2019, revealed 8 METS. I will defer nuclear stress testing a few more months until she is able to freely walk the treadmill.  Her symptoms of "chest discomfort" do not seem consistent with underlying coronary/ischemic process that would justify the need for a pharm Nuclear stress test performed now.\par \par she will continue LIPID therapy as prescribed. Goal LDL <100, ideally <70\par \par She knows to call me with any acute issues or if chest paresthesia return  \par \par The importance of dietary modification, weight loss, and regular exercise as tolerated was again emphasized to the patient today.\par \par I will call her with results of HOLTER and ECHO. Pending that result she can followup again in 2 months.

## 2022-07-06 NOTE — HISTORY OF PRESENT ILLNESS
[FreeTextEntry1] : Ms. Kathy Alexander presented to the office today for follow-up cardiac evaluation. I last evaluated the patient in the office on 10/15/19.\par \par The patient is a 70-year-old female with a history of a right bundle branch block pattern on her electrocardiogram, atypical chest discomfort, hypertension, a dyslipidemia, pre-diabetes, lower extremity venous insufficiency (status post RF ablation bilaterally), spinal arthritis.\par \par The patient has been doing well from a cardiac symptomatic standpoint since her previous visit here on 10/15/19. Specifically, she does not describe having experienced chest discomfort or dyspnea on exertion in association with her activities, noting that she walks regularly. She has not noted orthopnea or paroxysmal nocturnal dyspnea. She has noted improvement in her degree of lower extremity edema since undergoing the venous ablation procedures bilaterally. She has not experienced any episodes of palpitations, presyncope or syncope.\par \par Review of systems is significant for the patient having been evaluated by a vascular specialist regarding lower extremity venous insufficiency on 5/18/18. She subsequently underwent ablation procedures to both lower extremities, as well as a stab phlebectomy to the left lower extremity, over the following 1 to 2 months.\par \par As far as risk factors for coronary artery disease are concerned, the patient has a history of hypertension, a dyslipidemia (hypertriglyceridemia), and pre-diabetes. She discontinued cigarette smoking in January of 1991, having previously smoked an average of one pack per day for a period of 20 years. She describes having an immediate family history of premature coronary artery disease, stating that her brother suffered "a heart attack" at the age of 49.\par \par Past medical/surgical history according to the patient is otherwise significant for a cholecystectomy, a hysterectomy, and parathyroid surgery.\par \par Echocardiography most recently performed on 5/30/19 revealed normal cardiac chamber sizes with normal left ventricular wall thickness and wall motion. Left ventricular systolic function was normal, with an estimated ejection fraction of 70%. There was evidence for mildly impaired diastolic relaxation of the left ventricle. No significant valvular abnormalities were identified.\par \par Exercise stress testing most recently performed on 11/19/19 revealed the patient to exhibit a satisfactory exercise tolerance, or that the inducement of cardiac symptoms, electrocardiographic evidence of myocardial ischemia, or significant arrhythmias.\par \par Nuclear exercise testing previously performed at a radiology facility on 2/13/09 was interpreted as revealing the patient to exhibit satisfactory exercise tolerance, without the inducement of cardiac symptoms, electrocardiographic evidence of myocardial ischemia, or significant arrhythmias. The cardiac imaging portion of the study revealed normal left ventricular myocardial perfusion and systolic function, with a calculated ejection fraction of 90%\par \par Carotid artery Doppler testing performed most recently on 1/6/15 at a radiology facility was not reported as having demonstrated evidence for atherosclerosis formation or significant stenoses.\par \par Previous visit history as above:\par \par Ms Alexander arrives today for an acute visit. She presents after hospitalization at [facility].  She and her  reports that she rolled out of bed during the early morning last week. Her 's report of the event was that  "it seemed like she was having a bad dream". She sat up partially but was not fully awake, she then rolled side way off the bed and hit forhead on night table and on to the floor.  s\par She was not able get up, He states she was partially in incoherent, just moaning she initially wasn’t responding to her name. Her  called the ambulance and a few minutes later, she was awake.  she was taken to [facility]. they state all work up was negative including CT scan of head.  She was then advised to followup with neurology.\par she also notes an episode of chest pain on the right side of her chest 3 weeks ago while sitting watching TV.  She describes it as "sharp pins and needles".  Lasted for about an hour.  Denies any aggravating or alleviating factors.  She has only experienced this once, has not come back since.  Her  reports, she was moving furniture earlier that day. \par \par she is s/p Rt HIP replacement on 4/25/22, done at [facility]. States the surgery was uneventful.  She is undergoing outpatient physical therapy 2-3 times a week.  she took a break because it was making her sore. \par activity included walking on the treadmill for 5 minutes at a time, as well as a statiionary bike. \par during these times, she denies palpitations , chest pains or pressures.  she denies symptoms of pins and needles she recently experienced at rest. \par She denies short ness of breath with walking.  She denies dizzy spells or feeling near syncopal. \par \par She has been compliant with her medications for blood pressure.\par Losartan was recently added to her regimen by her PCP. \par

## 2022-07-11 ENCOUNTER — APPOINTMENT (OUTPATIENT)
Dept: CARDIOLOGY | Facility: CLINIC | Age: 72
End: 2022-07-11

## 2022-07-11 ENCOUNTER — MED ADMIN CHARGE (OUTPATIENT)
Age: 72
End: 2022-07-11

## 2022-07-11 PROCEDURE — 93224 XTRNL ECG REC UP TO 48 HRS: CPT

## 2022-07-11 PROCEDURE — 93306 TTE W/DOPPLER COMPLETE: CPT

## 2022-07-11 RX ADMIN — PERFLUTREN MG/ML: 6.52 INJECTION, SUSPENSION INTRAVENOUS at 00:00

## 2022-07-12 RX ORDER — PERFLUTREN 6.52 MG/ML
6.52 INJECTION, SUSPENSION INTRAVENOUS
Qty: 2 | Refills: 0 | Status: COMPLETED | OUTPATIENT
Start: 2022-07-11

## 2022-09-06 ENCOUNTER — OUTPATIENT (OUTPATIENT)
Dept: OUTPATIENT SERVICES | Facility: HOSPITAL | Age: 72
LOS: 1 days | End: 2022-09-06
Payer: MEDICARE

## 2022-09-06 VITALS
WEIGHT: 218.04 LBS | OXYGEN SATURATION: 96 % | TEMPERATURE: 99 F | HEART RATE: 77 BPM | RESPIRATION RATE: 16 BRPM | DIASTOLIC BLOOD PRESSURE: 75 MMHG | SYSTOLIC BLOOD PRESSURE: 145 MMHG

## 2022-09-06 DIAGNOSIS — R22.0 LOCALIZED SWELLING, MASS AND LUMP, HEAD: Chronic | ICD-10-CM

## 2022-09-06 DIAGNOSIS — Z98.890 OTHER SPECIFIED POSTPROCEDURAL STATES: Chronic | ICD-10-CM

## 2022-09-06 DIAGNOSIS — M75.101 UNSPECIFIED ROTATOR CUFF TEAR OR RUPTURE OF RIGHT SHOULDER, NOT SPECIFIED AS TRAUMATIC: ICD-10-CM

## 2022-09-06 DIAGNOSIS — Z96.641 PRESENCE OF RIGHT ARTIFICIAL HIP JOINT: Chronic | ICD-10-CM

## 2022-09-06 DIAGNOSIS — R56.9 UNSPECIFIED CONVULSIONS: ICD-10-CM

## 2022-09-06 DIAGNOSIS — Z01.818 ENCOUNTER FOR OTHER PREPROCEDURAL EXAMINATION: ICD-10-CM

## 2022-09-06 LAB
ALBUMIN SERPL ELPH-MCNC: 4 G/DL — SIGNIFICANT CHANGE UP (ref 3.3–5)
ALP SERPL-CCNC: 54 U/L — SIGNIFICANT CHANGE UP (ref 40–120)
ALT FLD-CCNC: 28 U/L — SIGNIFICANT CHANGE UP (ref 12–78)
ANION GAP SERPL CALC-SCNC: 5 MMOL/L — SIGNIFICANT CHANGE UP (ref 5–17)
APTT BLD: 31.8 SEC — SIGNIFICANT CHANGE UP (ref 27.5–35.5)
AST SERPL-CCNC: 18 U/L — SIGNIFICANT CHANGE UP (ref 15–37)
BILIRUB SERPL-MCNC: 0.3 MG/DL — SIGNIFICANT CHANGE UP (ref 0.2–1.2)
BLD GP AB SCN SERPL QL: SIGNIFICANT CHANGE UP
BUN SERPL-MCNC: 17 MG/DL — SIGNIFICANT CHANGE UP (ref 7–23)
CALCIUM SERPL-MCNC: 9.2 MG/DL — SIGNIFICANT CHANGE UP (ref 8.5–10.1)
CHLORIDE SERPL-SCNC: 108 MMOL/L — SIGNIFICANT CHANGE UP (ref 96–108)
CO2 SERPL-SCNC: 28 MMOL/L — SIGNIFICANT CHANGE UP (ref 22–31)
CREAT SERPL-MCNC: 0.78 MG/DL — SIGNIFICANT CHANGE UP (ref 0.5–1.3)
EGFR: 82 ML/MIN/1.73M2 — SIGNIFICANT CHANGE UP
GLUCOSE SERPL-MCNC: 153 MG/DL — HIGH (ref 70–99)
HCT VFR BLD CALC: 40.1 % — SIGNIFICANT CHANGE UP (ref 34.5–45)
HGB BLD-MCNC: 13 G/DL — SIGNIFICANT CHANGE UP (ref 11.5–15.5)
INR BLD: 0.97 RATIO — SIGNIFICANT CHANGE UP (ref 0.88–1.16)
MCHC RBC-ENTMCNC: 27 PG — SIGNIFICANT CHANGE UP (ref 27–34)
MCHC RBC-ENTMCNC: 32.4 GM/DL — SIGNIFICANT CHANGE UP (ref 32–36)
MCV RBC AUTO: 83.2 FL — SIGNIFICANT CHANGE UP (ref 80–100)
MRSA PCR RESULT.: SIGNIFICANT CHANGE UP
NRBC # BLD: 0 /100 WBCS — SIGNIFICANT CHANGE UP (ref 0–0)
PLATELET # BLD AUTO: 343 K/UL — SIGNIFICANT CHANGE UP (ref 150–400)
POTASSIUM SERPL-MCNC: 3.7 MMOL/L — SIGNIFICANT CHANGE UP (ref 3.5–5.3)
POTASSIUM SERPL-SCNC: 3.7 MMOL/L — SIGNIFICANT CHANGE UP (ref 3.5–5.3)
PROT SERPL-MCNC: 7.2 G/DL — SIGNIFICANT CHANGE UP (ref 6–8.3)
PROTHROM AB SERPL-ACNC: 11.4 SEC — SIGNIFICANT CHANGE UP (ref 10.5–13.4)
RBC # BLD: 4.82 M/UL — SIGNIFICANT CHANGE UP (ref 3.8–5.2)
RBC # FLD: 13.2 % — SIGNIFICANT CHANGE UP (ref 10.3–14.5)
S AUREUS DNA NOSE QL NAA+PROBE: DETECTED
SODIUM SERPL-SCNC: 141 MMOL/L — SIGNIFICANT CHANGE UP (ref 135–145)
WBC # BLD: 7 K/UL — SIGNIFICANT CHANGE UP (ref 3.8–10.5)
WBC # FLD AUTO: 7 K/UL — SIGNIFICANT CHANGE UP (ref 3.8–10.5)

## 2022-09-06 PROCEDURE — 85610 PROTHROMBIN TIME: CPT

## 2022-09-06 PROCEDURE — 86901 BLOOD TYPING SEROLOGIC RH(D): CPT

## 2022-09-06 PROCEDURE — 87640 STAPH A DNA AMP PROBE: CPT

## 2022-09-06 PROCEDURE — 73030 X-RAY EXAM OF SHOULDER: CPT

## 2022-09-06 PROCEDURE — 85027 COMPLETE CBC AUTOMATED: CPT

## 2022-09-06 PROCEDURE — 85730 THROMBOPLASTIN TIME PARTIAL: CPT

## 2022-09-06 PROCEDURE — G0463: CPT

## 2022-09-06 PROCEDURE — 86850 RBC ANTIBODY SCREEN: CPT

## 2022-09-06 PROCEDURE — 80177 DRUG SCRN QUAN LEVETIRACETAM: CPT

## 2022-09-06 PROCEDURE — 73030 X-RAY EXAM OF SHOULDER: CPT | Mod: 26,RT

## 2022-09-06 PROCEDURE — 87641 MR-STAPH DNA AMP PROBE: CPT

## 2022-09-06 PROCEDURE — 80053 COMPREHEN METABOLIC PANEL: CPT

## 2022-09-06 PROCEDURE — 36415 COLL VENOUS BLD VENIPUNCTURE: CPT

## 2022-09-06 PROCEDURE — 86900 BLOOD TYPING SEROLOGIC ABO: CPT

## 2022-09-06 RX ORDER — CINNAMON BARK 500 MG
2 CAPSULE ORAL
Qty: 0 | Refills: 0 | DISCHARGE

## 2022-09-06 NOTE — H&P PST ADULT - ASSESSMENT
70 y/o female with unspecified rotator cuff tear or rupture right shoulder, not specified as traumatic

## 2022-09-06 NOTE — H&P PST ADULT - FALL HARM RISK - FALL HARM RISK
No indicators present 5 yo male s/p head injury with large contusion to left frontal-parieto region. Will obtain head CT 7 yo male s/p head injury with large contusion to left frontal-parieto region. Will obtain head CT to r/o fracture and intracranial injury.

## 2022-09-06 NOTE — H&P PST ADULT - NSICDXPASTSURGICALHX_GEN_ALL_CORE_FT
PAST SURGICAL HISTORY:  S/P bunionectomy right bunionectomy with screws    S/P  Section     S/P cholecystectomy     S/P hip replacement, right (2022)    S/P hysterectomy     S/P parathyroidectomy     S/P tonsillectomy 1967    S/P tubal ligation 1992    Scalp mass (Right, 2019)

## 2022-09-06 NOTE — H&P PST ADULT - FALL HARM RISK - UNIVERSAL INTERVENTIONS
Bed in lowest position, wheels locked, appropriate side rails in place/Call bell, personal items and telephone in reach/Instruct patient to call for assistance before getting out of bed or chair/Non-slip footwear when patient is out of bed/[place] to call system/Physically safe environment - no spills, clutter or unnecessary equipment/Purposeful Proactive Rounding/Room/bathroom lighting operational, light cord in reach

## 2022-09-06 NOTE — H&P PST ADULT - HISTORY OF PRESENT ILLNESS
72 y/o female with PMH of seizure (last on 8/11/22), HTN and  72 y/o female with PMH of seizure (last on 8/11/22), HTN and anxiety here for PST. Pt complaining of chronic right shoulder pain for a few years but "now I have 3 torn tendons of my right shoulder after I fell during my first seizure on 6/29/22". Pt reports to right shoulder pain with use and rates pain to be 8/10 but does not take any po analgesia. Pt also with decreased ROM and decreased strength of right shoulder. Pt electing for right reverse total shoulder replacement on 9/30/2022.

## 2022-09-06 NOTE — H&P PST ADULT - PROBLEM SELECTOR PLAN 3
Medical clearance needed as per surgeon. Cardiology clearance needed for abnormal EKG.  CBC, Comprehensive panel, PT/PTT, T&S, HgbA1c, Nose culture and X-ray of right shoulder ordered.  EKG from 7/6/22 in chart.   Pre-op instructions and 3 days of surgical scrubs given and pt verbalized understanding.   COVID19 PCR testing to be done within 72 hours of surgery at [facility]. Medical clearance needed as per surgeon. Cardiology clearance needed for abnormal EKG.  CBC, Comprehensive panel, PT/PTT, T&S, Nose culture and X-ray of right shoulder ordered.  EKG from 7/6/22 in chart.   Pre-op instructions and 3 days of surgical scrubs given and pt verbalized understanding.   COVID19 PCR testing to be done within 72 hours of surgery at [facility].

## 2022-09-06 NOTE — H&P PST ADULT - NSALCOHOLPROBLEMSRELYN_GEN_A_CORE_SD
Batool Bustos  [id number]  1946    COLON DISCHARGE INSTRUCTIONS  Discomfort:  Redness at IV site- apply warm compress to area; if redness or soreness persist- contact your physician  There may be a slight amount of blood passed from the rectum  Gaseous discomfort- walking, belching will help relieve any discomfort  You may not operate a vehicle for 12 hours  You may not engage in an occupation involving machinery or appliances for rest of today  You may not drink alcoholic beverages for at least 12 hours  Avoid making any critical decisions for at least 24 hour  DIET:   High fiber diet. - however -  remember your colon is empty and a heavy meal will produce gas. Avoid these foods:  vegetables, fried / greasy foods, carbonated drinks for today    MEDICATIONS:  Avoid blood thinners for 1 week  XARELTO     ACTIVITY:  You may resume your normal daily activities it is recommended that you spend the remainder of the day resting -  avoid any strenuous activity. CALL M.D. ANY SIGN OF:   Increasing pain, nausea, vomiting  Abdominal distension (swelling)  New increased bleeding (oral or rectal)  Fever (chills)     Follow-up Instructions:   Call Hung Graham MD if any questions or problems. Telephone # [phone]  Biopsy results will be available in  7 to10 days  Should have a repeat colonoscopy in 1 year. COLONOSCOPY FINDINGS:  Your colonoscopy showed: multiple polyps in the ascending colon, hemorrhoids, normal colorectal anastomosis.
no

## 2022-09-06 NOTE — H&P PST ADULT - NSICDXFAMILYHX_GEN_ALL_CORE_FT
FAMILY HISTORY:  Sibling  Still living? No  FH: brain aneurysm, Age at diagnosis: Age Unknown  FH: myocardial infarction, Age at diagnosis: Age Unknown

## 2022-09-06 NOTE — H&P PST ADULT - NEUROLOGICAL COMMENTS
First seizure on 6/29/22. last seizure on 8/11/22. Pt follows with neurologist, last seen on 8/16/22.

## 2022-09-06 NOTE — H&P PST ADULT - NSICDXPASTMEDICALHX_GEN_ALL_CORE_FT
PAST MEDICAL HISTORY:  Anxiety     Hyperlipidemia     Hypertension     Seizure (Last seizure on 8/11/22, follows with neurolgist, Dr Moss)     PAST MEDICAL HISTORY:  Anxiety     Hyperlipidemia     Hypertension     Seizure (Last seizure on 8/11/22, follows with neurolgist, Dr Moss)    Unspecified rotator cuff tear or rupture of right shoulder, not specified as traumatic

## 2022-09-07 RX ORDER — MUPIROCIN 20 MG/G
1 OINTMENT TOPICAL
Qty: 15 | Refills: 0
Start: 2022-09-07 | End: 2022-09-11

## 2022-09-08 LAB — LEVETIRACETAM SERPL-MCNC: 22.8 UG/ML — SIGNIFICANT CHANGE UP (ref 10–40)

## 2022-09-10 PROBLEM — M75.101 UNSPECIFIED ROTATOR CUFF TEAR OR RUPTURE OF RIGHT SHOULDER, NOT SPECIFIED AS TRAUMATIC: Chronic | Status: ACTIVE | Noted: 2022-09-06

## 2022-09-10 PROBLEM — R56.9 UNSPECIFIED CONVULSIONS: Chronic | Status: ACTIVE | Noted: 2022-09-06

## 2022-09-10 PROBLEM — F41.9 ANXIETY DISORDER, UNSPECIFIED: Chronic | Status: ACTIVE | Noted: 2022-09-06

## 2022-09-14 ENCOUNTER — NON-APPOINTMENT (OUTPATIENT)
Age: 72
End: 2022-09-14

## 2022-09-19 ENCOUNTER — APPOINTMENT (OUTPATIENT)
Dept: CARDIOLOGY | Facility: CLINIC | Age: 72
End: 2022-09-19

## 2022-09-19 ENCOUNTER — NON-APPOINTMENT (OUTPATIENT)
Age: 72
End: 2022-09-19

## 2022-09-19 VITALS
BODY MASS INDEX: 36.49 KG/M2 | HEIGHT: 65 IN | WEIGHT: 219 LBS | OXYGEN SATURATION: 95 % | DIASTOLIC BLOOD PRESSURE: 81 MMHG | HEART RATE: 68 BPM | SYSTOLIC BLOOD PRESSURE: 136 MMHG

## 2022-09-19 DIAGNOSIS — R56.9 UNSPECIFIED CONVULSIONS: ICD-10-CM

## 2022-09-19 PROCEDURE — 99214 OFFICE O/P EST MOD 30 MIN: CPT

## 2022-09-19 PROCEDURE — 93000 ELECTROCARDIOGRAM COMPLETE: CPT

## 2022-09-19 RX ORDER — LEVETIRACETAM 500 MG/1
500 TABLET, FILM COATED ORAL TWICE DAILY
Qty: 60 | Refills: 0 | Status: ACTIVE | COMMUNITY
Start: 2022-09-19

## 2022-09-19 RX ORDER — SERTRALINE HYDROCHLORIDE 50 MG/1
50 TABLET, FILM COATED ORAL DAILY
Qty: 90 | Refills: 0 | Status: ACTIVE | COMMUNITY
Start: 2022-09-19

## 2022-09-19 RX ORDER — ASPIRIN ENTERIC COATED TABLETS 81 MG 81 MG/1
81 TABLET, DELAYED RELEASE ORAL
Qty: 90 | Refills: 0 | Status: ACTIVE | COMMUNITY
Start: 2022-09-19

## 2022-09-19 NOTE — REASON FOR VISIT
[Follow-Up - Clinic] : a clinic follow-up of [Abnormal ECG] : an abnormal ECG [Hyperlipidemia] : hyperlipidemia [Hypertension] : hypertension [Other: ____] : [unfilled] [Spouse] : spouse

## 2022-09-20 ENCOUNTER — NON-APPOINTMENT (OUTPATIENT)
Age: 72
End: 2022-09-20

## 2022-09-28 ENCOUNTER — OUTPATIENT (OUTPATIENT)
Dept: OUTPATIENT SERVICES | Facility: HOSPITAL | Age: 72
LOS: 1 days | End: 2022-09-28
Payer: MEDICARE

## 2022-09-28 ENCOUNTER — APPOINTMENT (OUTPATIENT)
Dept: CARDIOLOGY | Facility: CLINIC | Age: 72
End: 2022-09-28

## 2022-09-28 DIAGNOSIS — Z20.828 CONTACT WITH AND (SUSPECTED) EXPOSURE TO OTHER VIRAL COMMUNICABLE DISEASES: ICD-10-CM

## 2022-09-28 DIAGNOSIS — R22.0 LOCALIZED SWELLING, MASS AND LUMP, HEAD: Chronic | ICD-10-CM

## 2022-09-28 DIAGNOSIS — Z98.890 OTHER SPECIFIED POSTPROCEDURAL STATES: Chronic | ICD-10-CM

## 2022-09-28 DIAGNOSIS — Z96.641 PRESENCE OF RIGHT ARTIFICIAL HIP JOINT: Chronic | ICD-10-CM

## 2022-09-28 LAB — SARS-COV-2 RNA SPEC QL NAA+PROBE: SIGNIFICANT CHANGE UP

## 2022-09-28 PROCEDURE — U0005: CPT

## 2022-09-28 PROCEDURE — U0003: CPT

## 2022-09-28 NOTE — DISCUSSION/SUMMARY
[EKG obtained to assist in diagnosis and management of assessed problem(s)] : EKG obtained to assist in diagnosis and management of assessed problem(s) [FreeTextEntry1] : The patient has been doing well from a cardiac symptomatic standpoint since her previous visit here on 7/6/22.\par . Specifically, she does not describe having experienced any symptoms of chest discomfort which would be considered typical for an anginal syndrome. She has not experienced any signs or symptoms to suggest the presence of congestive heart failure or a hemodynamically-compromising arrhythmia. Her cardiac examination today is remarkable for a soft systolic ejection murmur, unchanged from her previous visit with Dr. Martinez.. Her blood pressure reading today is normal. \par Her electrocardiogram today reveals sinus rhythm with left anterior hemiblock, a right bundle branch block pattern, and non-diagnostic repolarization abnormalities, essentially unchanged from her previous office tracing..\par She will continue on her present medications.\par \par   From the perspective of preop management, I consider her optimized from a cardiovascular perspective for her planned procedure for  right shoulder surgery on September 29, 2022.  \par Routine hemodynamic monitoring is recommended.  I have asked her to hold the aspirin for 5 days prior to her procedure.  The morning of her surgery, I have asked her to make sure she takes her losartan, metoprolol, as well as her Keppra with sips of water.\par I have asked her to follow-up with Dr. Martinez in approximately 4 months.\par \par The importance of dietary modification, weight loss, and regular exercise as tolerated was again emphasized to the patient today.\par \par I have asked the patient to call me if she should have any questions or problems pertaining to these matters, and especially if she should experience any concerning symptoms.

## 2022-09-28 NOTE — HISTORY OF PRESENT ILLNESS
[FreeTextEntry1] : Ms. Kathy Alexander presented to the office today for follow-up cardiac evaluation .as well as cardiac clearance for her upcoming right shoulder surgery to be performed on September 30, 2022 by Dr Xie. \par \par  Recently over the summer, the patient experienced 2 episodes of seizure activity while she was sleeping.  The first seizure occurred on June 29 and the last 1 occurred in August.  At that point she was put on Keppra for seizure activity.  She has been tolerating the medication well, in addition to taking her cardiac medications which include losartan 25 mg daily as well as metoprolol succinate 50 mg daily and triamterene HCTZ 75/50 mg once daily.  In addition, she is taking Trilipix 135 mg capsule, aspirin, and fenofibrate.\par \par  She was  last evaluated  in the office on 7/6/22 by Moriah Reyes NP. Prior to that visit, she was seen by Dr Martinez on 12/29/20.\par \par The patient is a 70-year-old female with a history of a right bundle branch block pattern on her electrocardiogram, atypical chest discomfort, hypertension, a dyslipidemia, pre-diabetes, lower extremity venous insufficiency (status post RF ablation bilaterally), spinal arthritis. \par .\par \par The patient has been doing well from a cardiac symptomatic standpoint since her previous visit. Specifically, she does not describe having experienced chest discomfort or dyspnea on exertion in association with her activities, noting that she walks regularly. She has not noted orthopnea or paroxysmal nocturnal dyspnea. She has noted improvement in her degree of lower extremity edema since undergoing the venous ablation procedures bilaterally. She has not experienced any episodes of palpitations, presyncope or syncope.\par \par Review of systems is significant for the patient having been evaluated by a vascular specialist regarding lower extremity venous insufficiency on 5/18/18. She subsequently underwent ablation procedures to both lower extremities, as well as a stab phlebectomy to the left lower extremity, over the following 1 to 2 months.\par \par As far as risk factors for coronary artery disease are concerned, the patient has a history of hypertension, a dyslipidemia (hypertriglyceridemia), and pre-diabetes. She discontinued cigarette smoking in January of 1991, having previously smoked an average of one pack per day for a period of 20 years. She describes having an immediate family history of premature coronary artery disease, stating that her brother suffered "a heart attack" at the age of 49.\par \par Past medical/surgical history according to the patient is otherwise significant for a cholecystectomy, a hysterectomy, and parathyroid surgery.\par \par Echocardiography most recently performed on 5/30/19 revealed normal cardiac chamber sizes with normal left ventricular wall thickness and wall motion. Left ventricular systolic function was normal, with an estimated ejection fraction of 70%. There was evidence for mildly impaired diastolic relaxation of the left ventricle. No significant valvular abnormalities were identified.\par \par Exercise stress testing most recently performed on 11/19/19 revealed the patient to exhibit a satisfactory exercise tolerance, or that the inducement of cardiac symptoms, electrocardiographic evidence of myocardial ischemia, or significant arrhythmias.\par \par \par Carotid artery Doppler testing performed most recently on 1/6/15 at a radiology facility was not reported as having demonstrated evidence for atherosclerosis formation or significant stenoses.

## 2022-09-28 NOTE — PHYSICAL EXAM
[General Appearance - In No Acute Distress] : no acute distress [Normal Conjunctiva] : the conjunctiva exhibited no abnormalities [No Oral Pallor] : no oral pallor [] : no respiratory distress [Respiration, Rhythm And Depth] : normal respiratory rhythm and effort [Bowel Sounds] : normal bowel sounds [Abdomen Tenderness] : non-tender [Abnormal Walk] : normal gait [Cyanosis, Localized] : no localized cyanosis [Skin Color & Pigmentation] : normal skin color and pigmentation [Oriented To Time, Place, And Person] : oriented to person, place, and time [Not Palpable] : not palpable [No Precordial Heave] : no precordial heave was noted [Normal Rate] : normal [Rhythm Regular] : regular [Normal S1] : normal S1 [Normal S2] : normal S2 [No Gallop] : no gallop heard [I] : a grade 1 [2+] : left 2+ [No Abnormalities] : the abdominal aorta was not enlarged and no bruit was heard [No Pitting Edema] : no pitting edema present [No Edema] : no edema [No Cyanosis] : no cyanosis [No Clubbing] : no clubbing [Normal] : no rash, no skin lesions [de-identified] : right shoulder stiffness and pain [FreeTextEntry1] : generalized diminished breath sounds [Apical Thrill] : no thrill palpable at the apex [Click] : no click [Pericardial Rub] : no pericardial rub [Right Carotid Bruit] : no bruit heard over the right carotid [Left Carotid Bruit] : no bruit heard over the left carotid

## 2022-09-29 ENCOUNTER — TRANSCRIPTION ENCOUNTER (OUTPATIENT)
Age: 72
End: 2022-09-29

## 2022-09-29 NOTE — ASU PATIENT PROFILE, ADULT - PRO INTERPRETER NEED 2
Process Group Note    PATIENT'S NAME: Katherine Villalpando  MRN:   [id number]  :   1977  ACCT. NUMBER: [id number]  DATE OF SERVICE: 22  START TIME: 10:00 AM  END TIME: 10:50 AM  FACILITATOR: Becca Olivarez LICSW  TOPIC: MH Process Group    Diagnoses:  ***    [facility] Day Treatment  TRACK: 5A    NUMBER OF PARTICIPANTS: 5                                    Service Modality:  Video Visit     Telemedicine Visit: The patient's condition can be safely assessed and treated via synchronous audio and visual telemedicine encounter.      Reason for Telemedicine Visit: Services only offered telehealth    Originating Site (Patient Location): Patient's home    Distant Site (Provider Location): Provider Remote Setting- Home Office    Consent:  The patient/guardian has verbally consented to: the potential risks and benefits of telemedicine (video visit) versus in person care; bill my insurance or make self-payment for services provided; and responsibility for payment of non-covered services.     Patient would like the video invitation sent by:  My Chart    Mode of Communication:  Video Conference via Medical Zoom    As the provider I attest to compliance with applicable laws and regulations related to telemedicine.               Data:    Session content: At the start of this group, patients were invited to check in by identifying themselves, describing their current emotional status, and identifying issues to address in this group.   Area(s) of treatment focus addressed in this session included {OP BEH ADULT MH AREA OF TREATMENT FOCUS:457518}.  ***    Therapeutic Interventions/Treatment Strategies:  {OP  THERAPEUTIC INTERVENTIONS/TREATMENT:504034}    Assessment:    Patient response:   Patient responded to session by {PATIENT RESPONSE:367942}    Possible barriers to participation / learning include: {POSSIBLE BARRIERS:952858}    Health Issues:   {YES / NO:918192}       Substance Use  "Review:   Substance Use: {YES / NO:432147}    Mental Status/Behavioral Observations  Appearance:   {Appearance:416197::\"Appropriate \"}  Eye Contact:   {Eye Contact:683409::\"Good \"}  Psychomotor Behavior: {Psychomotor Behavior:978142::\"Normal \"}  Attitude:   {Attitude:051059::\"Cooperative \"}  Orientation:   {Orientation:441318::\"All\"}  Speech   Rate / Production: {Speech Rate/Production:913125::\"Normal \"}   Volume:  {Speech Volume:530210::\"Normal \"}  Mood:    {Mood:422656::\"Normal\"}  Affect:    {Affect:120794::\"Appropriate \"}  Thought Content:   {Thought Content with Safety:711203}  Thought Form:  {Thought Form:095598::\"Coherent \",\"Logical \"}    Insight:    {Insight:298653::\"Good \"}    Plan:     Safety Plan: {SAFETY PLAN:743293}     Barriers to treatment: {Barriers to Treatment:188811}    Patient Contracts (see media tab):  {Patient Contracts:378952}    Substance Use: {SUBSTANCE USE ASSESSMENT/INTERVENTION:582676}     Continue or Discharge: {Continue or Discharge:353417}      Becca Olivarez, [facility]  September 12, 2022  " English

## 2022-09-29 NOTE — ASU PATIENT PROFILE, ADULT - NSICDXPASTMEDICALHX_GEN_ALL_CORE_FT
PAST MEDICAL HISTORY:  Anxiety     Hyperlipidemia     Hypertension     Seizure (Last seizure on 8/11/22, follows with neurolgist, Dr Moss)    Unspecified rotator cuff tear or rupture of right shoulder, not specified as traumatic

## 2022-09-30 ENCOUNTER — TRANSCRIPTION ENCOUNTER (OUTPATIENT)
Age: 72
End: 2022-09-30

## 2022-09-30 ENCOUNTER — OUTPATIENT (OUTPATIENT)
Dept: OUTPATIENT SERVICES | Facility: HOSPITAL | Age: 72
LOS: 1 days | End: 2022-09-30
Payer: MEDICARE

## 2022-09-30 ENCOUNTER — RESULT REVIEW (OUTPATIENT)
Age: 72
End: 2022-09-30

## 2022-09-30 VITALS
RESPIRATION RATE: 15 BRPM | DIASTOLIC BLOOD PRESSURE: 57 MMHG | HEART RATE: 74 BPM | SYSTOLIC BLOOD PRESSURE: 108 MMHG | TEMPERATURE: 98 F | OXYGEN SATURATION: 96 %

## 2022-09-30 VITALS
HEIGHT: 65 IN | WEIGHT: 218.04 LBS | SYSTOLIC BLOOD PRESSURE: 127 MMHG | OXYGEN SATURATION: 97 % | HEART RATE: 63 BPM | RESPIRATION RATE: 13 BRPM | TEMPERATURE: 98 F | DIASTOLIC BLOOD PRESSURE: 65 MMHG

## 2022-09-30 DIAGNOSIS — Z98.890 OTHER SPECIFIED POSTPROCEDURAL STATES: Chronic | ICD-10-CM

## 2022-09-30 DIAGNOSIS — M75.101 UNSPECIFIED ROTATOR CUFF TEAR OR RUPTURE OF RIGHT SHOULDER, NOT SPECIFIED AS TRAUMATIC: ICD-10-CM

## 2022-09-30 DIAGNOSIS — Z96.641 PRESENCE OF RIGHT ARTIFICIAL HIP JOINT: Chronic | ICD-10-CM

## 2022-09-30 DIAGNOSIS — R22.0 LOCALIZED SWELLING, MASS AND LUMP, HEAD: Chronic | ICD-10-CM

## 2022-09-30 DIAGNOSIS — Z01.818 ENCOUNTER FOR OTHER PREPROCEDURAL EXAMINATION: ICD-10-CM

## 2022-09-30 LAB — BLD GP AB SCN SERPL QL: SIGNIFICANT CHANGE UP

## 2022-09-30 PROCEDURE — 86900 BLOOD TYPING SEROLOGIC ABO: CPT

## 2022-09-30 PROCEDURE — C1713: CPT

## 2022-09-30 PROCEDURE — 88305 TISSUE EXAM BY PATHOLOGIST: CPT

## 2022-09-30 PROCEDURE — 86901 BLOOD TYPING SEROLOGIC RH(D): CPT

## 2022-09-30 PROCEDURE — 36415 COLL VENOUS BLD VENIPUNCTURE: CPT

## 2022-09-30 PROCEDURE — 73030 X-RAY EXAM OF SHOULDER: CPT

## 2022-09-30 PROCEDURE — C1776: CPT

## 2022-09-30 PROCEDURE — 88311 DECALCIFY TISSUE: CPT

## 2022-09-30 PROCEDURE — 86850 RBC ANTIBODY SCREEN: CPT

## 2022-09-30 PROCEDURE — 73030 X-RAY EXAM OF SHOULDER: CPT | Mod: 26,RT

## 2022-09-30 PROCEDURE — 88305 TISSUE EXAM BY PATHOLOGIST: CPT | Mod: 26

## 2022-09-30 PROCEDURE — 23472 RECONSTRUCT SHOULDER JOINT: CPT | Mod: RT

## 2022-09-30 PROCEDURE — 88311 DECALCIFY TISSUE: CPT | Mod: 26

## 2022-09-30 DEVICE — BEARING HUMERAL 36MM STD: Type: IMPLANTABLE DEVICE | Site: RIGHT | Status: FUNCTIONAL

## 2022-09-30 DEVICE — SCREW COMP LOKG 3.5 HEX 4.75X20MM: Type: IMPLANTABLE DEVICE | Site: RIGHT | Status: FUNCTIONAL

## 2022-09-30 DEVICE — STEM COMPREHENSIVE MINI 11MM: Type: IMPLANTABLE DEVICE | Site: RIGHT | Status: FUNCTIONAL

## 2022-09-30 DEVICE — GLENOSPHERE 36MM DIA OF CURVE: Type: IMPLANTABLE DEVICE | Site: RIGHT | Status: FUNCTIONAL

## 2022-09-30 DEVICE — SCREW LOKG FIXED 3.5 HEX 4.75X25MM: Type: IMPLANTABLE DEVICE | Site: RIGHT | Status: FUNCTIONAL

## 2022-09-30 DEVICE — BASEPLATE GLENOID MINI REV 25MM: Type: IMPLANTABLE DEVICE | Site: RIGHT | Status: FUNCTIONAL

## 2022-09-30 DEVICE — SCREW RVS CNTRL 6.5X25MM ST: Type: IMPLANTABLE DEVICE | Site: RIGHT | Status: FUNCTIONAL

## 2022-09-30 DEVICE — IMPLANTABLE DEVICE: Type: IMPLANTABLE DEVICE | Site: RIGHT | Status: FUNCTIONAL

## 2022-09-30 DEVICE — TRAY HUM COMP VERSA-DIA STD: Type: IMPLANTABLE DEVICE | Site: RIGHT | Status: FUNCTIONAL

## 2022-09-30 DEVICE — PIN REVERSE SHOULDER: Type: IMPLANTABLE DEVICE | Site: RIGHT | Status: FUNCTIONAL

## 2022-09-30 RX ORDER — SODIUM CHLORIDE 9 MG/ML
1000 INJECTION, SOLUTION INTRAVENOUS
Refills: 0 | Status: DISCONTINUED | OUTPATIENT
Start: 2022-09-30 | End: 2022-09-30

## 2022-09-30 RX ORDER — BUPIVACAINE 13.3 MG/ML
20 INJECTION, SUSPENSION, LIPOSOMAL INFILTRATION ONCE
Refills: 0 | Status: DISCONTINUED | OUTPATIENT
Start: 2022-09-30 | End: 2022-09-30

## 2022-09-30 RX ORDER — CEFAZOLIN SODIUM 1 G
2000 VIAL (EA) INJECTION ONCE
Refills: 0 | Status: COMPLETED | OUTPATIENT
Start: 2022-09-30 | End: 2022-09-30

## 2022-09-30 RX ORDER — HYDROMORPHONE HYDROCHLORIDE 2 MG/ML
0.5 INJECTION INTRAMUSCULAR; INTRAVENOUS; SUBCUTANEOUS
Refills: 0 | Status: DISCONTINUED | OUTPATIENT
Start: 2022-09-30 | End: 2022-09-30

## 2022-09-30 RX ORDER — OXYCODONE AND ACETAMINOPHEN 5; 325 MG/1; MG/1
1 TABLET ORAL
Qty: 28 | Refills: 0
Start: 2022-09-30 | End: 2022-10-06

## 2022-09-30 RX ORDER — ONDANSETRON 8 MG/1
4 TABLET, FILM COATED ORAL ONCE
Refills: 0 | Status: DISCONTINUED | OUTPATIENT
Start: 2022-09-30 | End: 2022-09-30

## 2022-09-30 RX ADMIN — SODIUM CHLORIDE 60 MILLILITER(S): 9 INJECTION, SOLUTION INTRAVENOUS at 07:25

## 2022-09-30 RX ADMIN — SODIUM CHLORIDE 75 MILLILITER(S): 9 INJECTION, SOLUTION INTRAVENOUS at 12:23

## 2022-09-30 NOTE — ASU DISCHARGE PLAN (ADULT/PEDIATRIC) - NS MD DC FALL RISK RISK
For information on Fall & Injury Prevention, visit: [email or web address] OR  [email or web address] OR  https://www.cdc.gov/steadi/patient.html

## 2022-09-30 NOTE — ASU DISCHARGE PLAN (ADULT/PEDIATRIC) - ASU DC SPECIAL INSTRUCTIONSFT
Sling/Non weight bearing on the operative shoulder. No range of motion to operative shoulder. It is ok to move the elbow/wrist/fingers.     If you have a new onset of numbness or tingling in your arm or hand that was not present before surgery that lasts longer than 24 hours call your surgeon.  Call this office to make a follow up appointment for next week

## 2022-09-30 NOTE — ASU DISCHARGE PLAN (ADULT/PEDIATRIC) - CARE PROVIDER_API CALL
Mode Xie (MD)  Orthopaedic Surgery  [address] [place] [address]  Phone: [phone]  Fax: [phone]  Follow Up Time:

## 2022-10-04 LAB — SURGICAL PATHOLOGY STUDY: SIGNIFICANT CHANGE UP

## 2022-10-26 NOTE — H&P PST ADULT - COMMENTS
Per discussion w/ triage RN, RADHA   
scalp mass

## 2023-01-17 ENCOUNTER — APPOINTMENT (OUTPATIENT)
Dept: CARDIOLOGY | Facility: CLINIC | Age: 73
End: 2023-01-17
Payer: MEDICARE

## 2023-01-17 ENCOUNTER — NON-APPOINTMENT (OUTPATIENT)
Age: 73
End: 2023-01-17

## 2023-01-17 VITALS
BODY MASS INDEX: 35.82 KG/M2 | OXYGEN SATURATION: 96 % | SYSTOLIC BLOOD PRESSURE: 153 MMHG | DIASTOLIC BLOOD PRESSURE: 80 MMHG | WEIGHT: 215 LBS | HEIGHT: 65 IN | HEART RATE: 74 BPM

## 2023-01-17 DIAGNOSIS — R01.1 CARDIAC MURMUR, UNSPECIFIED: ICD-10-CM

## 2023-01-17 PROCEDURE — 99215 OFFICE O/P EST HI 40 MIN: CPT

## 2023-01-17 PROCEDURE — 93000 ELECTROCARDIOGRAM COMPLETE: CPT

## 2023-01-27 ENCOUNTER — NON-APPOINTMENT (OUTPATIENT)
Age: 73
End: 2023-01-27

## 2023-01-30 ENCOUNTER — APPOINTMENT (OUTPATIENT)
Dept: CARDIOLOGY | Facility: CLINIC | Age: 73
End: 2023-01-30
Payer: MEDICARE

## 2023-01-30 LAB
ALBUMIN SERPL ELPH-MCNC: 4.6 G/DL
ALP BLD-CCNC: 49 U/L
ALT SERPL-CCNC: 16 U/L
ANION GAP SERPL CALC-SCNC: 10 MMOL/L
AST SERPL-CCNC: 12 U/L
BILIRUB SERPL-MCNC: 0.2 MG/DL
BUN SERPL-MCNC: 19 MG/DL
CALCIUM SERPL-MCNC: 10.5 MG/DL
CHLORIDE SERPL-SCNC: 98 MMOL/L
CHOLEST SERPL-MCNC: 153 MG/DL
CO2 SERPL-SCNC: 29 MMOL/L
CREAT SERPL-MCNC: 0.9 MG/DL
EGFR: 68 ML/MIN/1.73M2
ESTIMATED AVERAGE GLUCOSE: 126 MG/DL
GLUCOSE SERPL-MCNC: 110 MG/DL
HBA1C MFR BLD HPLC: 6 %
HDLC SERPL-MCNC: 42 MG/DL
LDLC SERPL CALC-MCNC: 89 MG/DL
LDLC SERPL DIRECT ASSAY-MCNC: 94 MG/DL
NONHDLC SERPL-MCNC: 111 MG/DL
POTASSIUM SERPL-SCNC: 4.3 MMOL/L
PROT SERPL-MCNC: 7.2 G/DL
SODIUM SERPL-SCNC: 136 MMOL/L
TRIGL SERPL-MCNC: 112 MG/DL

## 2023-01-30 PROCEDURE — A9500: CPT

## 2023-01-30 PROCEDURE — 78452 HT MUSCLE IMAGE SPECT MULT: CPT

## 2023-01-30 PROCEDURE — 93015 CV STRESS TEST SUPVJ I&R: CPT

## 2023-03-06 ENCOUNTER — OUTPATIENT (OUTPATIENT)
Dept: OUTPATIENT SERVICES | Facility: HOSPITAL | Age: 73
LOS: 1 days | End: 2023-03-06
Payer: MEDICARE

## 2023-03-06 VITALS
HEART RATE: 70 BPM | SYSTOLIC BLOOD PRESSURE: 123 MMHG | HEIGHT: 65 IN | DIASTOLIC BLOOD PRESSURE: 84 MMHG | OXYGEN SATURATION: 99 % | WEIGHT: 218.04 LBS | RESPIRATION RATE: 17 BRPM | TEMPERATURE: 98 F

## 2023-03-06 DIAGNOSIS — M76.62 ACHILLES TENDINITIS, LEFT LEG: ICD-10-CM

## 2023-03-06 DIAGNOSIS — M20.42 OTHER HAMMER TOE(S) (ACQUIRED), LEFT FOOT: ICD-10-CM

## 2023-03-06 DIAGNOSIS — M79.672 PAIN IN LEFT FOOT: ICD-10-CM

## 2023-03-06 DIAGNOSIS — M77.31 CALCANEAL SPUR, RIGHT FOOT: ICD-10-CM

## 2023-03-06 DIAGNOSIS — Z98.890 OTHER SPECIFIED POSTPROCEDURAL STATES: Chronic | ICD-10-CM

## 2023-03-06 DIAGNOSIS — Z01.818 ENCOUNTER FOR OTHER PREPROCEDURAL EXAMINATION: ICD-10-CM

## 2023-03-06 DIAGNOSIS — Z96.641 PRESENCE OF RIGHT ARTIFICIAL HIP JOINT: Chronic | ICD-10-CM

## 2023-03-06 DIAGNOSIS — R22.0 LOCALIZED SWELLING, MASS AND LUMP, HEAD: Chronic | ICD-10-CM

## 2023-03-06 DIAGNOSIS — Z96.611 PRESENCE OF RIGHT ARTIFICIAL SHOULDER JOINT: Chronic | ICD-10-CM

## 2023-03-06 LAB
ALBUMIN SERPL ELPH-MCNC: 3.9 G/DL — SIGNIFICANT CHANGE UP (ref 3.3–5)
ALP SERPL-CCNC: 49 U/L — SIGNIFICANT CHANGE UP (ref 40–120)
ALT FLD-CCNC: 27 U/L — SIGNIFICANT CHANGE UP (ref 12–78)
ANION GAP SERPL CALC-SCNC: 5 MMOL/L — SIGNIFICANT CHANGE UP (ref 5–17)
AST SERPL-CCNC: 18 U/L — SIGNIFICANT CHANGE UP (ref 15–37)
BILIRUB SERPL-MCNC: 0.3 MG/DL — SIGNIFICANT CHANGE UP (ref 0.2–1.2)
BUN SERPL-MCNC: 25 MG/DL — HIGH (ref 7–23)
CALCIUM SERPL-MCNC: 9.3 MG/DL — SIGNIFICANT CHANGE UP (ref 8.5–10.1)
CHLORIDE SERPL-SCNC: 101 MMOL/L — SIGNIFICANT CHANGE UP (ref 96–108)
CO2 SERPL-SCNC: 29 MMOL/L — SIGNIFICANT CHANGE UP (ref 22–31)
CREAT SERPL-MCNC: 0.88 MG/DL — SIGNIFICANT CHANGE UP (ref 0.5–1.3)
EGFR: 70 ML/MIN/1.73M2 — SIGNIFICANT CHANGE UP
GLUCOSE SERPL-MCNC: 109 MG/DL — HIGH (ref 70–99)
HCT VFR BLD CALC: 38.1 % — SIGNIFICANT CHANGE UP (ref 34.5–45)
HGB BLD-MCNC: 12.1 G/DL — SIGNIFICANT CHANGE UP (ref 11.5–15.5)
MCHC RBC-ENTMCNC: 26.8 PG — LOW (ref 27–34)
MCHC RBC-ENTMCNC: 31.8 GM/DL — LOW (ref 32–36)
MCV RBC AUTO: 84.3 FL — SIGNIFICANT CHANGE UP (ref 80–100)
NRBC # BLD: 0 /100 WBCS — SIGNIFICANT CHANGE UP (ref 0–0)
PLATELET # BLD AUTO: 343 K/UL — SIGNIFICANT CHANGE UP (ref 150–400)
POTASSIUM SERPL-MCNC: 3.9 MMOL/L — SIGNIFICANT CHANGE UP (ref 3.5–5.3)
POTASSIUM SERPL-SCNC: 3.9 MMOL/L — SIGNIFICANT CHANGE UP (ref 3.5–5.3)
PROT SERPL-MCNC: 7.3 G/DL — SIGNIFICANT CHANGE UP (ref 6–8.3)
RBC # BLD: 4.52 M/UL — SIGNIFICANT CHANGE UP (ref 3.8–5.2)
RBC # FLD: 13.2 % — SIGNIFICANT CHANGE UP (ref 10.3–14.5)
SODIUM SERPL-SCNC: 135 MMOL/L — SIGNIFICANT CHANGE UP (ref 135–145)
WBC # BLD: 7.01 K/UL — SIGNIFICANT CHANGE UP (ref 3.8–10.5)
WBC # FLD AUTO: 7.01 K/UL — SIGNIFICANT CHANGE UP (ref 3.8–10.5)

## 2023-03-06 PROCEDURE — 80053 COMPREHEN METABOLIC PANEL: CPT

## 2023-03-06 PROCEDURE — 36415 COLL VENOUS BLD VENIPUNCTURE: CPT

## 2023-03-06 PROCEDURE — G0463: CPT

## 2023-03-06 PROCEDURE — 85027 COMPLETE CBC AUTOMATED: CPT

## 2023-03-06 RX ORDER — ASPIRIN/CALCIUM CARB/MAGNESIUM 324 MG
1 TABLET ORAL
Qty: 0 | Refills: 0 | DISCHARGE

## 2023-03-06 NOTE — H&P PST ADULT - NSICDXPASTSURGICALHX_GEN_ALL_CORE_FT
PAST SURGICAL HISTORY:  S/P bunionectomy right bunionectomy with screws    S/P  Section     S/P cholecystectomy     S/P hip replacement, right (2022)    S/P hysterectomy     S/P parathyroidectomy     S/P tonsillectomy 1967    S/P tubal ligation 1992    Scalp mass (Right, 2019)    Status post shoulder replacement, right

## 2023-03-06 NOTE — H&P PST ADULT - PROBLEM SELECTOR PLAN 1
PST: CBC CMP   All preoperative instructions including CHD cleanse reviewed with patient who verbalized understanding.   Medical evaluation with Dr. Loomis, Cardiology clearance with Dr Martinez, Neurology clearance with Dr. Moss  All preoperative instructions including CHD cleanse reviewed with patient who verbalized understanding. Avoid all NSAID/ASA containing products as well as all herbal/vitamin supplements. Tylenol only for pain/headache. Fully vaccinated; Denies recent travel, recent illness and sick contact with COVID in the last 3 weeks Covid swab at pt preferred site 3-5 days prior to procedure.Prescription given to pt.  Pt verbalized understanding.

## 2023-03-06 NOTE — H&P PST ADULT - HISTORY OF PRESENT ILLNESS
This 71 yo f with significant PMHX Seizure disordered, HTN, HLD, GERD, Anxiety presents to PST for a scheduled left foot retro calcaneal HS Cristiano repair achilles hallas long transfer with Dr. Andrade 3/14/23

## 2023-03-06 NOTE — H&P PST ADULT - ASSESSMENT
This 73 yo f with significant PMHX Seizure disordered, HTN, HLD, GERD, Anxiety presents to PST for a scheduled left foot retro calcaneal HS Cristiano repair achilles hallas long transfer with Dr. Andrade 3/14/23

## 2023-03-06 NOTE — H&P PST ADULT - ATTENDING COMMENTS
This 73 yo f with significant PMHX Seizure disordered, HTN, HLD, GERD, Anxiety presents to PST for a scheduled left foot retro calcaneal HS Cristiano repair achilles hallas long transfer with Dr. Andrade 3/14/23 VSS, patient has no new medical diagnosis or complaints since the PST visit. ROS is unremarkable, PE LCTA, HR S1S2, abdomen soft +BS, no C/C/E. No obvious contraindications noted. Pt is stable for surgery.

## 2023-03-06 NOTE — H&P PST ADULT - PATIENT OPTIMIZED PENDING
Medical evaluation with Dr. Quintana, Cardiology clearance with Dr Martinez, Neurology clearance with Dr. Moss

## 2023-03-13 NOTE — ASU PATIENT PROFILE, ADULT - SITE
Left Foot Excision Retro Calcaneal H.S. Cristiano's- Repair Left Achilles Flexor Hallucis Longus Transfer W/ Fluorscopy

## 2023-03-14 ENCOUNTER — TRANSCRIPTION ENCOUNTER (OUTPATIENT)
Age: 73
End: 2023-03-14

## 2023-03-14 ENCOUNTER — OUTPATIENT (OUTPATIENT)
Dept: OUTPATIENT SERVICES | Facility: HOSPITAL | Age: 73
LOS: 1 days | End: 2023-03-14
Payer: MEDICARE

## 2023-03-14 VITALS
RESPIRATION RATE: 12 BRPM | DIASTOLIC BLOOD PRESSURE: 56 MMHG | OXYGEN SATURATION: 97 % | HEART RATE: 66 BPM | SYSTOLIC BLOOD PRESSURE: 118 MMHG | HEIGHT: 65 IN | TEMPERATURE: 97 F | WEIGHT: 218.04 LBS

## 2023-03-14 VITALS
SYSTOLIC BLOOD PRESSURE: 124 MMHG | OXYGEN SATURATION: 98 % | RESPIRATION RATE: 15 BRPM | TEMPERATURE: 98 F | DIASTOLIC BLOOD PRESSURE: 61 MMHG | HEART RATE: 65 BPM

## 2023-03-14 DIAGNOSIS — Z96.611 PRESENCE OF RIGHT ARTIFICIAL SHOULDER JOINT: Chronic | ICD-10-CM

## 2023-03-14 DIAGNOSIS — Z01.818 ENCOUNTER FOR OTHER PREPROCEDURAL EXAMINATION: ICD-10-CM

## 2023-03-14 DIAGNOSIS — M77.31 CALCANEAL SPUR, RIGHT FOOT: ICD-10-CM

## 2023-03-14 DIAGNOSIS — Z96.641 PRESENCE OF RIGHT ARTIFICIAL HIP JOINT: Chronic | ICD-10-CM

## 2023-03-14 DIAGNOSIS — M20.42 OTHER HAMMER TOE(S) (ACQUIRED), LEFT FOOT: ICD-10-CM

## 2023-03-14 DIAGNOSIS — M76.62 ACHILLES TENDINITIS, LEFT LEG: ICD-10-CM

## 2023-03-14 DIAGNOSIS — Z98.890 OTHER SPECIFIED POSTPROCEDURAL STATES: Chronic | ICD-10-CM

## 2023-03-14 DIAGNOSIS — R22.0 LOCALIZED SWELLING, MASS AND LUMP, HEAD: Chronic | ICD-10-CM

## 2023-03-14 DIAGNOSIS — M79.672 PAIN IN LEFT FOOT: ICD-10-CM

## 2023-03-14 PROCEDURE — C1713: CPT

## 2023-03-14 PROCEDURE — 76000 FLUOROSCOPY <1 HR PHYS/QHP: CPT

## 2023-03-14 PROCEDURE — 27691 REVISE LOWER LEG TENDON: CPT | Mod: LT

## 2023-03-14 PROCEDURE — 28119 REMOVAL OF HEEL SPUR: CPT | Mod: LT

## 2023-03-14 PROCEDURE — 88311 DECALCIFY TISSUE: CPT | Mod: 26

## 2023-03-14 PROCEDURE — 73630 X-RAY EXAM OF FOOT: CPT | Mod: 26,LT

## 2023-03-14 PROCEDURE — 88311 DECALCIFY TISSUE: CPT

## 2023-03-14 PROCEDURE — 88304 TISSUE EXAM BY PATHOLOGIST: CPT | Mod: 26

## 2023-03-14 PROCEDURE — 27650 REPAIR ACHILLES TENDON: CPT | Mod: LT

## 2023-03-14 PROCEDURE — 73630 X-RAY EXAM OF FOOT: CPT

## 2023-03-14 PROCEDURE — 88304 TISSUE EXAM BY PATHOLOGIST: CPT

## 2023-03-14 DEVICE — IMP FHL 6.25MM: Type: IMPLANTABLE DEVICE | Site: LEFT | Status: FUNCTIONAL

## 2023-03-14 DEVICE — IMPLANTABLE DEVICE: Type: IMPLANTABLE DEVICE | Site: LEFT | Status: FUNCTIONAL

## 2023-03-14 DEVICE — IMP SYS MIDSUBSTANCE ACHILLES SPEEDBRIDGE: Type: IMPLANTABLE DEVICE | Site: LEFT | Status: FUNCTIONAL

## 2023-03-14 RX ORDER — CINNAMON BARK 500 MG
2 CAPSULE ORAL
Qty: 0 | Refills: 0 | DISCHARGE

## 2023-03-14 RX ORDER — HYDROMORPHONE HYDROCHLORIDE 2 MG/ML
0.5 INJECTION INTRAMUSCULAR; INTRAVENOUS; SUBCUTANEOUS
Refills: 0 | Status: DISCONTINUED | OUTPATIENT
Start: 2023-03-14 | End: 2023-03-14

## 2023-03-14 RX ORDER — ASPIRIN/CALCIUM CARB/MAGNESIUM 324 MG
1 TABLET ORAL
Qty: 0 | Refills: 0 | DISCHARGE

## 2023-03-14 RX ORDER — TRIAMTERENE/HYDROCHLOROTHIAZID 75 MG-50MG
1 TABLET ORAL
Qty: 0 | Refills: 0 | DISCHARGE

## 2023-03-14 RX ORDER — LEVETIRACETAM 250 MG/1
1 TABLET, FILM COATED ORAL
Qty: 0 | Refills: 0 | DISCHARGE

## 2023-03-14 RX ORDER — METOPROLOL TARTRATE 50 MG
1 TABLET ORAL
Qty: 0 | Refills: 0 | DISCHARGE

## 2023-03-14 RX ORDER — CEFAZOLIN SODIUM 1 G
2000 VIAL (EA) INJECTION ONCE
Refills: 0 | Status: COMPLETED | OUTPATIENT
Start: 2023-03-14 | End: 2023-03-14

## 2023-03-14 RX ORDER — SODIUM CHLORIDE 9 MG/ML
1000 INJECTION, SOLUTION INTRAVENOUS
Refills: 0 | Status: DISCONTINUED | OUTPATIENT
Start: 2023-03-14 | End: 2023-03-14

## 2023-03-14 RX ORDER — SERTRALINE 25 MG/1
1 TABLET, FILM COATED ORAL
Qty: 0 | Refills: 0 | DISCHARGE

## 2023-03-14 RX ORDER — ONDANSETRON 8 MG/1
4 TABLET, FILM COATED ORAL ONCE
Refills: 0 | Status: DISCONTINUED | OUTPATIENT
Start: 2023-03-14 | End: 2023-03-14

## 2023-03-14 RX ORDER — LOSARTAN POTASSIUM 100 MG/1
1 TABLET, FILM COATED ORAL
Qty: 0 | Refills: 0 | DISCHARGE

## 2023-03-14 RX ORDER — FENOFIBRATE,MICRONIZED 130 MG
1 CAPSULE ORAL
Qty: 0 | Refills: 0 | DISCHARGE

## 2023-03-14 RX ORDER — HYDROMORPHONE HYDROCHLORIDE 2 MG/ML
1 INJECTION INTRAMUSCULAR; INTRAVENOUS; SUBCUTANEOUS
Refills: 0 | Status: DISCONTINUED | OUTPATIENT
Start: 2023-03-14 | End: 2023-03-14

## 2023-03-14 RX ADMIN — SODIUM CHLORIDE 75 MILLILITER(S): 9 INJECTION, SOLUTION INTRAVENOUS at 12:03

## 2023-03-14 RX ADMIN — SODIUM CHLORIDE 75 MILLILITER(S): 9 INJECTION, SOLUTION INTRAVENOUS at 09:08

## 2023-03-14 NOTE — ASU DISCHARGE PLAN (ADULT/PEDIATRIC) - ASU DC SPECIAL INSTRUCTIONSFT
Please follow all pre printed instructions given in office. Any questions or problems call [phone]

## 2023-03-14 NOTE — ASU DISCHARGE PLAN (ADULT/PEDIATRIC) - NS MD DC FALL RISK RISK
For information on Fall & Injury Prevention, visit: [email or web address] OR  [email or web address] OR  https://www.cdc.gov/steadi/patient.html

## 2023-03-14 NOTE — BRIEF OPERATIVE NOTE - NSICDXBRIEFPREOP_GEN_ALL_CORE_FT
PRE-OP DIAGNOSIS:  Retrocalcaneal exostosis 14-Mar-2023 11:52:26  Bon Andrade  Cristiano's deformity, left 14-Mar-2023 11:52:35  Bon Andrade  Left Achilles tendinitis 14-Mar-2023 11:52:58  Bon Andrade

## 2023-03-14 NOTE — BRIEF OPERATIVE NOTE - NSICDXBRIEFPROCEDURE_GEN_ALL_CORE_FT
PROCEDURES:  Excision of retrocalcaneal spur of left heel 14-Mar-2023 11:51:30  Bon Andrade  Reduction of Cristiano's deformity 14-Mar-2023 11:51:38  Bon Andrade  Repair, Achilles tendon 14-Mar-2023 11:51:50  Bon Andrade  Transfer, tendon, flexor hallucis longus 14-Mar-2023 11:52:02  Bon Andrade

## 2023-03-14 NOTE — BRIEF OPERATIVE NOTE - NSICDXBRIEFPOSTOP_GEN_ALL_CORE_FT
POST-OP DIAGNOSIS:  Retrocalcaneal exostosis 14-Mar-2023 11:54:02  Bon Andrade  Cristiano's deformity, left 14-Mar-2023 11:54:12  Bon Andrade  Left Achilles tendinitis 14-Mar-2023 11:54:24  Bon Andrade

## 2023-03-16 LAB — SURGICAL PATHOLOGY STUDY: SIGNIFICANT CHANGE UP

## 2023-11-01 ENCOUNTER — APPOINTMENT (OUTPATIENT)
Dept: VASCULAR SURGERY | Facility: CLINIC | Age: 73
End: 2023-11-01
Payer: MEDICARE

## 2023-11-01 VITALS
DIASTOLIC BLOOD PRESSURE: 77 MMHG | HEIGHT: 65 IN | HEART RATE: 76 BPM | TEMPERATURE: 98.2 F | WEIGHT: 215 LBS | SYSTOLIC BLOOD PRESSURE: 127 MMHG | BODY MASS INDEX: 35.82 KG/M2

## 2023-11-01 PROCEDURE — 93970 EXTREMITY STUDY: CPT

## 2023-11-01 PROCEDURE — 99204 OFFICE O/P NEW MOD 45 MIN: CPT

## 2023-11-02 ENCOUNTER — NON-APPOINTMENT (OUTPATIENT)
Age: 73
End: 2023-11-02

## 2023-11-30 RX ORDER — SODIUM BICARBONATE 84 MG/ML
8.4 INJECTION, SOLUTION INTRAVENOUS
Qty: 5 | Refills: 0 | Status: COMPLETED | COMMUNITY
Start: 2023-11-30 | End: 1900-01-01

## 2023-11-30 RX ORDER — LIDOCAINE HYDROCHLORIDE 10 MG/ML
1 INJECTION, SOLUTION INFILTRATION; PERINEURAL
Qty: 50 | Refills: 0 | Status: COMPLETED | COMMUNITY
Start: 2023-11-30 | End: 1900-01-01

## 2023-11-30 RX ORDER — ALPRAZOLAM 0.5 MG/1
0.5 TABLET ORAL
Qty: 1 | Refills: 0 | Status: COMPLETED | COMMUNITY
Start: 2023-11-30 | End: 1900-01-01

## 2023-12-04 ENCOUNTER — APPOINTMENT (OUTPATIENT)
Dept: VASCULAR SURGERY | Facility: CLINIC | Age: 73
End: 2023-12-04
Payer: MEDICARE

## 2023-12-04 PROCEDURE — 36475 ENDOVENOUS RF 1ST VEIN: CPT | Mod: RT

## 2023-12-11 ENCOUNTER — APPOINTMENT (OUTPATIENT)
Dept: VASCULAR SURGERY | Facility: CLINIC | Age: 73
End: 2023-12-11
Payer: MEDICARE

## 2023-12-11 PROCEDURE — 93971 EXTREMITY STUDY: CPT | Mod: RT

## 2024-01-03 ENCOUNTER — APPOINTMENT (OUTPATIENT)
Dept: VASCULAR SURGERY | Facility: CLINIC | Age: 74
End: 2024-01-03
Payer: MEDICARE

## 2024-01-03 VITALS
HEART RATE: 71 BPM | HEIGHT: 65 IN | WEIGHT: 215 LBS | SYSTOLIC BLOOD PRESSURE: 138 MMHG | BODY MASS INDEX: 35.82 KG/M2 | DIASTOLIC BLOOD PRESSURE: 82 MMHG | TEMPERATURE: 98.1 F

## 2024-01-03 VITALS — DIASTOLIC BLOOD PRESSURE: 80 MMHG | HEART RATE: 74 BPM | SYSTOLIC BLOOD PRESSURE: 133 MMHG

## 2024-01-03 PROCEDURE — 99213 OFFICE O/P EST LOW 20 MIN: CPT

## 2024-01-03 NOTE — PHYSICAL EXAM
[2+] : left 2+ [Ankle Swelling (On Exam)] : present [Ankle Swelling Bilaterally] : bilaterally  [Varicose Veins Of Lower Extremities] : present [Varicose Veins Of The Right Leg] : of the right leg [] : bilaterally [Ankle Swelling On The Right] : mild [No Rash or Lesion] : No rash or lesion [Alert] : alert [Oriented to Person] : oriented to person [Oriented to Place] : oriented to place [Oriented to Time] : oriented to time [Calm] : calm [de-identified] : NAD [de-identified] : NCAT [de-identified] : unlabored breathing [FreeTextEntry1] : bilateral lower extremities soft, warm and nontender venous stasis changes with dry flaky skin and hyperpigmentation varicose veins on right anterior thigh and lateral calf intact skin no wounds or ulcers [de-identified] : AYDEL [de-identified] : diana cranial nerves 2-12 diana grossly intact [de-identified] : cooperative

## 2024-01-03 NOTE — HISTORY OF PRESENT ILLNESS
[FreeTextEntry1] : Pt is here for 1 month follow up. She is s/p right AGSV RFA on 12/4/2023. History of venous insufficiency and previous left GSV RFA, right ASV RFA, and left leg stab phlebectomy in 2018. Pt's right leg feels much better. She states the discomfort on her right leg improved post procedure. Denies heaviness, achiness, throbbing or itching. Pt has bilateral leg swelling but does not wear compression stockings. Denies claudication, rest pain, nonhealing wounds or ulcers. She is scheduled for right leg stab phlebectomy on 1/29/24

## 2024-01-03 NOTE — ASSESSMENT
[FreeTextEntry1] : Impression - chronic venous insufficiency with varicose veins  Plan Conservative medical management - leg elevation, exercise regimen, weight loss, diet control, moisturize skin, knee high 20-30 mm hg compression stockings Advised pt to keep vein procedure appointment for RLE stab phlebectomy on 1/29/2024 [Arterial/Venous Disease] : arterial/venous disease [Other: _____] : [unfilled]

## 2024-01-23 RX ORDER — ALPRAZOLAM 0.25 MG/1
0.25 TABLET ORAL
Qty: 1 | Refills: 0 | Status: COMPLETED | COMMUNITY
Start: 2024-01-23 | End: 2024-01-29

## 2024-01-29 ENCOUNTER — APPOINTMENT (OUTPATIENT)
Dept: VASCULAR SURGERY | Facility: CLINIC | Age: 74
End: 2024-01-29
Payer: MEDICARE

## 2024-01-29 PROCEDURE — 37765 STAB PHLEB VEINS XTR 10-20: CPT | Mod: RT

## 2024-01-29 NOTE — PROCEDURE
[FreeTextEntry1] : right stab phlebectomy  [FreeTextEntry3] : Procedural safety checklist and time out completed: Confirmed patient identification (Patient Name, , and/or medical record number including when possible affirmation by patient or parent/family/other. Confirmed procedure with the patient. Consent present, accurate and signed.  Confirmed special equipment and supplies are present. Sterility confirmed. Position verified.  Site/ side is marked and visible and confirmed.  Procedure confirmed by consent. Accurate consent including side and site. Review of medical records noting correct procedure including site and side. MD/PA verifies presence and review of imaging studies and or written report of imaging studies. Specify equipment are available for the planned procedure. MD/PA has marked the patient's procedural site and side. Agreement on the procedure to be performed Time out completed. All of the above has been confirmed by the team. All patient-specific concerns have been addressed.   Indication:  right lower extremity varicose veins with inflammation, leg pain, leg swelling, and leg cramping.    Procedure: Stab phlebectomy right lower extremity   Ms. SONI CROWELL is a 73 year old F with a history of symptomatic right lower extremity varicose veins. A trial of compression stockings, exercise, elevation, and pain medication was attempted without relief and definitive treatment with microphlebectomy was offered.   I have discussed the risks of the procedure at length with the patient. The risks discussed were inclusive of but not limited to infection, irritation at the site of infiltration of local anesthesia, and also rare risk of deep venous thrombosis and pulmonary emboli. The patient agrees to proceed with the procedure.  The patient was escorted into the procedure room, the varicose veins for treatment were marked out and the patient placed on the examination table. The entire limb was prepped and draped in sterile fashion and a  time out was called.   Local anesthesia using _20_ cc 1% lidocaine was infiltrated using a 25 gauge needle over the previously marked prominent varicose vein sites. Multiple small stab incisions, each less than 1 cm in length was made at the noted sites. With the help of a vein hook, the vein was fished out at each of these sites, rolled over a narrow-tipped mosquito clamp and removed. Hemostasis was secured with leg elevation and application of manual pressure. After assuring hemostasis, a sterile 4x4 was placed on the access sites and an ACE compression wrap was applied. Estimated blood loss: minimal. Patient tolerated procedure well. Patient was given post-procedure instructions and follow up appointment was scheduled.   SIDE - RIGHT  SITE - CALF / THIGH LOCATION - LATERAL / POSTERIOR	 Total Stab Incisions 10-20__

## 2024-02-21 ENCOUNTER — APPOINTMENT (OUTPATIENT)
Dept: VASCULAR SURGERY | Facility: CLINIC | Age: 74
End: 2024-02-21
Payer: MEDICARE

## 2024-02-21 VITALS — DIASTOLIC BLOOD PRESSURE: 74 MMHG | HEART RATE: 57 BPM | SYSTOLIC BLOOD PRESSURE: 126 MMHG

## 2024-02-21 VITALS
TEMPERATURE: 97.2 F | SYSTOLIC BLOOD PRESSURE: 107 MMHG | BODY MASS INDEX: 35.82 KG/M2 | HEART RATE: 66 BPM | HEIGHT: 65 IN | WEIGHT: 215 LBS | DIASTOLIC BLOOD PRESSURE: 70 MMHG

## 2024-02-21 DIAGNOSIS — I87.2 VENOUS INSUFFICIENCY (CHRONIC) (PERIPHERAL): ICD-10-CM

## 2024-02-21 DIAGNOSIS — I83.891 VARICOSE VEINS OF RIGHT LOWER EXTREMITY WITH OTHER COMPLICATIONS: ICD-10-CM

## 2024-02-21 PROCEDURE — 99212 OFFICE O/P EST SF 10 MIN: CPT

## 2024-03-13 NOTE — ASU DISCHARGE PLAN (ADULT/PEDIATRIC) - NO SPORTS/GYM DURATION
Sending to PCP for review.  Please review and advise on patient MyChart message.    Patient requesting medication for MARIA LUISA scheduled at [facility] on 3/24/2024  Diazepam (VALIUM) 5 mg tablet, #4 tabs last given on 12/12/2022.     [facility] [address], [place] - [address] [id number] - [place], [place] - [address] [facility] [address]  [facility]     as per Dr. Garcia

## 2024-04-01 ENCOUNTER — APPOINTMENT (OUTPATIENT)
Dept: CARDIOLOGY | Facility: CLINIC | Age: 74
End: 2024-04-01
Payer: MEDICARE

## 2024-04-01 VITALS
OXYGEN SATURATION: 97 % | BODY MASS INDEX: 36.65 KG/M2 | SYSTOLIC BLOOD PRESSURE: 129 MMHG | HEIGHT: 65 IN | WEIGHT: 220 LBS | HEART RATE: 71 BPM | DIASTOLIC BLOOD PRESSURE: 78 MMHG

## 2024-04-01 DIAGNOSIS — E78.5 HYPERLIPIDEMIA, UNSPECIFIED: ICD-10-CM

## 2024-04-01 DIAGNOSIS — I45.10 UNSPECIFIED RIGHT BUNDLE-BRANCH BLOCK: ICD-10-CM

## 2024-04-01 DIAGNOSIS — I10 ESSENTIAL (PRIMARY) HYPERTENSION: ICD-10-CM

## 2024-04-01 PROCEDURE — G2211 COMPLEX E/M VISIT ADD ON: CPT

## 2024-04-01 PROCEDURE — 99215 OFFICE O/P EST HI 40 MIN: CPT

## 2024-04-01 PROCEDURE — 93000 ELECTROCARDIOGRAM COMPLETE: CPT

## 2024-04-01 RX ORDER — ERGOCALCIFEROL 1.25 MG/1
1.25 MG CAPSULE, LIQUID FILLED ORAL
Qty: 12 | Refills: 0 | Status: ACTIVE | COMMUNITY
Start: 2024-02-29

## 2024-04-01 NOTE — CARDIOLOGY SUMMARY
[de-identified] : 4/1/24 -sinus rhythm at a rate of 69 bpm.  First-degree AV block.  Left anterior hemiblock.  Right bundle branch block.  Nondiagnostic repolarization abnormalities.

## 2024-04-01 NOTE — PHYSICAL EXAM
[General Appearance - In No Acute Distress] : no acute distress [No Oral Pallor] : no oral pallor [Normal Conjunctiva] : the conjunctiva exhibited no abnormalities [] : no respiratory distress [Respiration, Rhythm And Depth] : normal respiratory rhythm and effort [Bowel Sounds] : normal bowel sounds [Abnormal Walk] : normal gait [Abdomen Tenderness] : non-tender [Skin Color & Pigmentation] : normal skin color and pigmentation [Cyanosis, Localized] : no localized cyanosis [Not Palpable] : not palpable [Oriented To Time, Place, And Person] : oriented to person, place, and time [No Precordial Heave] : no precordial heave was noted [Rhythm Regular] : regular [Normal Rate] : normal [Normal S2] : normal S2 [Normal S1] : normal S1 [No Gallop] : no gallop heard [I] : a grade 1 [2+] : right 2+ [No Pitting Edema] : no pitting edema present [No Abnormalities] : the abdominal aorta was not enlarged and no bruit was heard [FreeTextEntry1] : generalized diminished breath sounds [Apical Thrill] : no thrill palpable at the apex [Click] : no click [Pericardial Rub] : no pericardial rub [Right Carotid Bruit] : no bruit heard over the right carotid [Left Carotid Bruit] : no bruit heard over the left carotid

## 2024-04-01 NOTE — HISTORY OF PRESENT ILLNESS
[FreeTextEntry1] : Ms. Kathy Alexander presented to the office today for follow-up cardiac evaluation.  I last evaluated the patient in the office on 1/17/2023.  The patient is a 73-year-old female with a history of a right bundle branch block pattern on her electrocardiogram, atypical chest discomfort, hypertension, a dyslipidemia, pre-diabetes, vitamin D deficiency, lower extremity venous insufficiency (status post RF ablation bilaterally), a seizure disorder (status post seizures 6/29/2022 with subsequent initiation of Keppra), spinal arthritis, cholecystectomy, a hysterectomy, parathyroid surgery, right total hip replacement (4/25/2022), right shoulder reverse arthroplasty (9/30/2022), and left foot surgery (heel spur resection, resection of Cristiano's deformity, Achilles tendon repair, and flexor hallucis tendon transfer 3/14/2023).  The patient has been stable from a cardiac symptomatic standpoint since her previous visit here on 1/17/2023. Specifically, she does not describe having experienced chest discomfort or dyspnea on exertion in association with her activities, noting that she walks regularly. She has not noted orthopnea or paroxysmal nocturnal dyspnea. She has noted improvement in her degree of lower extremity edema since undergoing venous ablation procedures bilaterally. She has not experienced any episodes of palpitations, presyncope or syncope.  The patient was brought via ambulance to [facility] on 6/29/2022, after having rolled out of bed and seeming to be incoherent and unable to get up from the floor, according to her , who witnessed this episode.  Her evaluation at [facility] was apparently unrevealing.  The patient was subsequently evaluated by a neurologist, however, who diagnosed the patient has having a seizure disorder, for which therapy with Keppra was initiated.  The patient has been following with a vascular specialist regarding varicose veins associated with lower extremity venous insufficiency.  She has undergone ablation procedures to both lower extremities including left GSV RFA, right AASV RFA, and right a GSV RFA on 12/4/2023.  In addition, she underwent stab phlebectomy to the left lower extremity in 2018 and the right lower extremity on 1/29/2024.  As far as risk factors for coronary artery disease are concerned, the patient has a history of hypertension, a dyslipidemia (hypertriglyceridemia), and pre-diabetes. She discontinued cigarette smoking in January of 1991, having previously smoked an average of one pack per day for a period of 20 years. She describes having an immediate family history of premature coronary artery disease, stating that her brother suffered "a heart attack" at the age of 49, followed by multiple coronary stenting procedures, subsequent CABG, and implantation of a defibrillator.  Laboratory studies performed on 1/30/2023 (on fenofibrate 134 mg daily) revealed cholesterol 153, triglycerides 112, HDL 42, and direct LDL 94.  The liver chemistries were normal.  The BUN and creatinine were 19 and 0.9, respectively.  The potassium level was 4.3.  The glucose level was 110 and the hemoglobin A1c level was 6.0%.  Nuclear stress testing performed on 1/30/2023 revealed the patient to exhibit normal exercise tolerance without the inducement of cardiac symptoms or electrocardiographic evidence of myocardial ischemia.  Rare ventricular premature contractions were exhibited.  The cardiac imaging portion of the study revealed normal left ventricular myocardial perfusion and systolic function, with a calculated ejection fraction of 84%.  Echocardiography most recently performed on 7/11/2022 revealed the left atrium to be mildly dilated.  The remainder of the cardiac chambers were normal in dimension.  Left ventricular wall thickness and wall motion were normal, with an estimated ejection fraction of 65 to 70%.  Mild left ventricular diastolic dysfunction was demonstrated.  Mild tricuspid regurgitation was demonstrated, without evidence of pulmonary hypertension.  Carotid artery Doppler testing performed most recently on 1/6/15 at a radiology facility was not reported as having demonstrated evidence for atherosclerosis formation or significant stenoses.  A 24-hour Holter monitor study performed on 7/11/2022 revealed the predominant rhythm throughout the recording to be sinus rhythm at an average rate of 79 bpm with very rare supraventricular and ventricular premature contractions.  No episodes of supraventricular or ventricular tachycardia were exhibited.  No significant pauses were exhibited.

## 2024-04-01 NOTE — DISCUSSION/SUMMARY
[FreeTextEntry1] : The patient has been doing well from a cardiac symptomatic standpoint since her previous visit here on 1/17/2023. Specifically, she does not describe having experienced any symptoms of chest discomfort which would be considered typical for an anginal syndrome. She has not experienced any signs or symptoms to suggest the presence of congestive heart failure or a hemodynamically-compromising arrhythmia. Her cardiac examination today is remarkable for a soft systolic ejection murmur, unchanged from her previous visit with me. Her blood pressure reading today is normal. Her electrocardiogram today reveals sinus rhythm with first-degree AV block, left anterior hemiblock, a right bundle branch block pattern, and non-diagnostic repolarization abnormalities, essentially unchanged from her previous office tracing.  As her blood pressure reading today is normal, I have instructed the patient to continue the present dosages of Toprol-XL, losartan, and Dyazide for the time being, and her blood pressure will be followed.  I have reviewed the findings of the blood test report of 1/30/2023 in detail with the patient today, and I have instructed her to continue fenofibrate 134 mg daily for the time being.  She reports having had follow-up blood testing performed through the office of her PCP, Dr. Martinez, earlier today, and she will have a copy of the results forwarded to my office for my review.  The importance of dietary modification, weight loss, and regular exercise as tolerated was again emphasized to the patient today.  I have reviewed the findings of the nuclear stress study of 1/30/2023, the echocardiogram of 7/11/2022, the carotid artery Doppler study of 1/6/2015, and the Holter monitor study of 7/11/2022 in detail with the patient today.  I have asked the patient to call me if she should have any questions or problems pertaining to these matters, and especially if she should experience any concerning symptoms. I have otherwise asked her to return to the office for follow-up cardiac evaluation and blood pressure reassessment in 6 months, provided she remains clinically stable in the interim. [EKG obtained to assist in diagnosis and management of assessed problem(s)] : EKG obtained to assist in diagnosis and management of assessed problem(s)

## 2024-10-07 ENCOUNTER — APPOINTMENT (OUTPATIENT)
Dept: CARDIOLOGY | Facility: CLINIC | Age: 74
End: 2024-10-07
Payer: MEDICARE

## 2024-10-07 VITALS
HEIGHT: 65 IN | DIASTOLIC BLOOD PRESSURE: 90 MMHG | HEART RATE: 70 BPM | SYSTOLIC BLOOD PRESSURE: 159 MMHG | OXYGEN SATURATION: 97 % | WEIGHT: 226 LBS | BODY MASS INDEX: 37.65 KG/M2

## 2024-10-07 DIAGNOSIS — R01.1 CARDIAC MURMUR, UNSPECIFIED: ICD-10-CM

## 2024-10-07 DIAGNOSIS — I45.10 UNSPECIFIED RIGHT BUNDLE-BRANCH BLOCK: ICD-10-CM

## 2024-10-07 DIAGNOSIS — E78.5 HYPERLIPIDEMIA, UNSPECIFIED: ICD-10-CM

## 2024-10-07 DIAGNOSIS — I10 ESSENTIAL (PRIMARY) HYPERTENSION: ICD-10-CM

## 2024-10-07 PROCEDURE — 93000 ELECTROCARDIOGRAM COMPLETE: CPT

## 2024-10-07 PROCEDURE — 99215 OFFICE O/P EST HI 40 MIN: CPT

## 2024-10-07 PROCEDURE — G2211 COMPLEX E/M VISIT ADD ON: CPT

## 2025-04-03 ENCOUNTER — NON-APPOINTMENT (OUTPATIENT)
Age: 75
End: 2025-04-03

## 2025-04-05 ENCOUNTER — NON-APPOINTMENT (OUTPATIENT)
Age: 75
End: 2025-04-05

## 2025-04-07 ENCOUNTER — APPOINTMENT (OUTPATIENT)
Dept: CARDIOLOGY | Facility: CLINIC | Age: 75
End: 2025-04-07
Payer: MEDICARE

## 2025-04-07 VITALS
WEIGHT: 232 LBS | HEIGHT: 65 IN | BODY MASS INDEX: 38.65 KG/M2 | HEART RATE: 73 BPM | SYSTOLIC BLOOD PRESSURE: 144 MMHG | DIASTOLIC BLOOD PRESSURE: 82 MMHG | OXYGEN SATURATION: 95 %

## 2025-04-07 DIAGNOSIS — I10 ESSENTIAL (PRIMARY) HYPERTENSION: ICD-10-CM

## 2025-04-07 DIAGNOSIS — R01.1 CARDIAC MURMUR, UNSPECIFIED: ICD-10-CM

## 2025-04-07 DIAGNOSIS — E78.5 HYPERLIPIDEMIA, UNSPECIFIED: ICD-10-CM

## 2025-04-07 DIAGNOSIS — I45.10 UNSPECIFIED RIGHT BUNDLE-BRANCH BLOCK: ICD-10-CM

## 2025-04-07 PROCEDURE — 93000 ELECTROCARDIOGRAM COMPLETE: CPT

## 2025-04-07 PROCEDURE — G2211 COMPLEX E/M VISIT ADD ON: CPT

## 2025-04-07 PROCEDURE — 99215 OFFICE O/P EST HI 40 MIN: CPT

## 2025-04-07 RX ORDER — GABAPENTIN 100 MG/1
100 CAPSULE ORAL
Qty: 540 | Refills: 0 | Status: ACTIVE | COMMUNITY
Start: 2025-01-06

## 2025-04-07 RX ORDER — METHIMAZOLE 5 MG/1
5 TABLET ORAL
Qty: 90 | Refills: 0 | Status: ACTIVE | COMMUNITY
Start: 2025-01-13

## 2025-04-07 NOTE — ASU PATIENT PROFILE, ADULT - FALL HARM RISK - UNIVERSAL INTERVENTIONS
residual R-sided weakness, mostly bedbound, getting home PT  asa  eliquis  statin
Bed in lowest position, wheels locked, appropriate side rails in place/Call bell, personal items and telephone in reach/Instruct patient to call for assistance before getting out of bed or chair/Non-slip footwear when patient is out of bed/[place] to call system/Physically safe environment - no spills, clutter or unnecessary equipment/Purposeful Proactive Rounding/Room/bathroom lighting operational, light cord in reach

## (undated) DEVICE — PLV/PSP-ESU FORCEFX F8I7418A: Type: DURABLE MEDICAL EQUIPMENT

## (undated) DEVICE — SUT POLYSORB 2-0 30" GS-21 UNDYED

## (undated) DEVICE — SUT MONOSOF 4-0 18" P-12

## (undated) DEVICE — STRYKER INTERPULSE HANDPIECE W IRR SUCTION TUBE

## (undated) DEVICE — SYR LUER LOK 20CC

## (undated) DEVICE — WARMING BLANKET LOWER ADULT

## (undated) DEVICE — VENODYNE/SCD SLEEVE CALF LARGE

## (undated) DEVICE — DRAPE U POLY BLUE 60"X60"

## (undated) DEVICE — STAPLER COVIDIEN SKIN APPOSE 35

## (undated) DEVICE — DRAPE SHOULDER

## (undated) DEVICE — PLV/PSP-TOURNIQUET #10 402009190016: Type: DURABLE MEDICAL EQUIPMENT

## (undated) DEVICE — DRSG ADAPTIC 3 X 8"

## (undated) DEVICE — SUT POLYSORB 4-0 18" P-12 UNDYED

## (undated) DEVICE — GLV 7.5 PROTEXIS (WHITE)

## (undated) DEVICE — SAW BLADE CONMED LINVATEC RECIPROCATING CROSS CUT SMALL 5.5 X 9.5MM

## (undated) DEVICE — CATH IV SAFE INSYTE 14G X 1.75" (ORANGE)

## (undated) DEVICE — SUT POLYSORB 1 27" GS-21 UNDYED

## (undated) DEVICE — SOL IRR BAG NS 0.9% 3000ML

## (undated) DEVICE — GLV 8 PROTEXIS (WHITE)

## (undated) DEVICE — DRAPE TOWEL BLUE 17" X 24"

## (undated) DEVICE — DRSG AQUACEL 3.5 X 10"

## (undated) DEVICE — SAW BLADE LINVATEC SAGITTAL MIC 9.5X25.5X0.4MM

## (undated) DEVICE — PLV-STRYKER SYSTEM 8: Type: DURABLE MEDICAL EQUIPMENT

## (undated) DEVICE — SUT VLOC 180 3-0 18" P-12 UNDYED

## (undated) DEVICE — DRAPE 3/4 SHEET W REINFORCEMENT 56X77"

## (undated) DEVICE — PLV-SCD MACHINE: Type: DURABLE MEDICAL EQUIPMENT

## (undated) DEVICE — YELLOW PIN COVER

## (undated) DEVICE — SUT POLYSORB 2-0 30" GS-22 UNDYED

## (undated) DEVICE — SLING SHOULDER IMMOBILIZER CLINIC LARGE

## (undated) DEVICE — SUT NDL MAYO CATGUT 1/2 CIRCLE TAPER POINT 0.050" X 1.716"

## (undated) DEVICE — FOLEY TRAY 16FR LF URINE METER SURESTEP

## (undated) DEVICE — SOL IRR POUR NS 0.9% 1000ML

## (undated) DEVICE — DRSG DERMABOND 0.7ML

## (undated) DEVICE — DRAPE INSTRUMENT POUCH 6.75" X 11"

## (undated) DEVICE — SUT POLYSORB 3-0 18" P-12 UNDYED

## (undated) DEVICE — DRILL BIT BIOMET 2.7MM

## (undated) DEVICE — DRAPE IOBAN 23" X 23"

## (undated) DEVICE — TOURNIQUET CUFF 18" DUAL PORT SINGLE BLADDER W PLC  (BLACK)

## (undated) DEVICE — SUT FIBERWIRE #2 38" STRAND 1 BLUE T-5 TAPER

## (undated) DEVICE — SUT POLYSORB 2-0 30" C-14 UNDYED

## (undated) DEVICE — VENODYNE/SCD SLEEVE CALF MEDIUM

## (undated) DEVICE — DRSG STERISTRIPS 0.5 X 4"

## (undated) DEVICE — DRSG ACE BANDAGE 4"

## (undated) DEVICE — BLADE SCALPEL SAFETYLOCK #15

## (undated) DEVICE — DRSG COBAN 4"

## (undated) DEVICE — LAP PAD W RING 18 X 18"

## (undated) DEVICE — DRAIN PENROSE .25" X 18" LATEX

## (undated) DEVICE — CATH IV SAFE INSYTE 18G X 1.16" (GREEN)

## (undated) DEVICE — DRILL BIT BIOMET 3.2MM

## (undated) DEVICE — SUT BIOSYN 3-0 27" P-12

## (undated) DEVICE — SUT TICRON 2 30" KV-37

## (undated) DEVICE — TOURNIQUET ESMARK 4"

## (undated) DEVICE — DRSG ADAPTIC 3 X 3"

## (undated) DEVICE — WARMING BLANKET UPPER ADULT

## (undated) DEVICE — PACK LOWER EXTREMITY NS PLAINVI

## (undated) DEVICE — DRSG CURITY GAUZE SPONGE 4 X 4" 12-PLY